# Patient Record
Sex: MALE | Race: BLACK OR AFRICAN AMERICAN | Employment: FULL TIME | RURAL
[De-identification: names, ages, dates, MRNs, and addresses within clinical notes are randomized per-mention and may not be internally consistent; named-entity substitution may affect disease eponyms.]

---
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
## 2019-03-15 ENCOUNTER — TELEPHONE (OUTPATIENT)
Dept: NEUROLOGY | Age: 19
End: 2019-03-15

## 2019-03-15 NOTE — TELEPHONE ENCOUNTER
----- Message from Betina Meraz sent at 3/14/2019  2:31 PM EDT -----  Regarding: Dr. Kelsey Wheeler  Mrs. Greyson Bailon, pt's mother, requesting to schedule an EEG appt referred by [address] [place] [address]. Ms. Greyson Bailon is calling the office to send referral to this office.   Best contact number [phone]

## 2019-03-15 NOTE — TELEPHONE ENCOUNTER
Patient has not been seen by our office.  LM for call back to set up new patient appointment as we do not do tests only here

## 2019-03-28 ENCOUNTER — TELEPHONE (OUTPATIENT)
Dept: NEUROLOGY | Age: 19
End: 2019-03-28

## 2019-03-28 ENCOUNTER — OFFICE VISIT (OUTPATIENT)
Dept: INTERNAL MEDICINE CLINIC | Age: 19
End: 2019-03-28

## 2019-03-28 VITALS
RESPIRATION RATE: 14 BRPM | DIASTOLIC BLOOD PRESSURE: 76 MMHG | HEART RATE: 69 BPM | HEIGHT: 68 IN | OXYGEN SATURATION: 98 % | SYSTOLIC BLOOD PRESSURE: 122 MMHG | TEMPERATURE: 97.5 F | WEIGHT: 165 LBS | BODY MASS INDEX: 25.01 KG/M2

## 2019-03-28 DIAGNOSIS — R55 BLACK-OUT (NOT AMNESIA): Primary | ICD-10-CM

## 2019-03-28 DIAGNOSIS — Z20.2 EXPOSURE TO SEXUALLY TRANSMITTED DISEASE (STD): ICD-10-CM

## 2019-03-28 DIAGNOSIS — F33.1 DEPRESSION, MAJOR, RECURRENT, MODERATE (HCC): ICD-10-CM
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
## 2019-03-28 NOTE — PROGRESS NOTES
Chief Complaint   Patient presents with    Referral Request    Exposure to STD     I have reviewed the patient's medical history in detail and updated the computerized patient record. Health Maintenance reviewed. 1. Have you been to the ER, urgent care clinic since your last visit? Hospitalized since your last visit?no    2. Have you seen or consulted any other health care providers outside of the [address] [place] [address] since your last visit? Include any pap smears or colon screening. No      Encouraged pt to discuss pt's wishes with spouse/partner/family and bring them in the next appt to follow thru with the Advanced Directive    Fall Risk Assessment, last 12 mths 3/28/2019   Able to walk? Yes   Fall in past 12 months? No       3 most recent PHQ Screens 3/28/2019   PHQ Not Done -   Little interest or pleasure in doing things Nearly every day   Feeling down, depressed, irritable, or hopeless Nearly every day   Total Score PHQ 2 6   Trouble falling or staying asleep, or sleeping too much -   Feeling tired or having little energy -   Poor appetite, weight loss, or overeating -   Feeling bad about yourself - or that you are a failure or have let yourself or your family down -   Trouble concentrating on things such as school, work, reading, or watching TV -   Moving or speaking so slowly that other people could have noticed; or the opposite being so fidgety that others notice -   Thoughts of being better off dead, or hurting yourself in some way -   PHQ 9 Score -       Abuse Screening Questionnaire 3/28/2019   Do you ever feel afraid of your partner? N   Are you in a relationship with someone who physically or mentally threatens you? N   Is it safe for you to go home?  Y       ADL Assessment 3/28/2019   Feeding yourself No Help Needed   Getting from bed to chair No Help Needed   Getting dressed No Help Needed   Bathing or showering No Help Needed   Walk across the room (includes cane/walker) No Help Needed Using the telphone No Help Needed   Taking your medications No Help Needed   Preparing meals No Help Needed   Managing money (expenses/bills) No Help Needed   Moderately strenuous housework (laundry) No Help Needed   Shopping for personal items (toiletries/medicines) No Help Needed   Shopping for groceries No Help Needed   Driving Help Needed   Climbing a flight of stairs Help Needed   Getting to places beyond walking distances Help Needed     Lab orders given to patient to take to the Health Dept.

## 2019-03-28 NOTE — TELEPHONE ENCOUNTER
Left a message to have a new patient appointment scheduled, patient needs to be seen before any testing can be ordered.

## 2019-03-28 NOTE — TELEPHONE ENCOUNTER
----- Message from Esau Murguia sent at 3/28/2019 11:20 AM EDT -----  Regarding: Dr Alon Urena  Pt (p) [phone], pt was referred by his PCP [facility] provider Dr Samantha Wolfe,  For seizures and wanted him to schedule an EEG, pt would like a return call back to schedule that test.

## 2019-03-29 ENCOUNTER — TELEPHONE (OUTPATIENT)
Dept: NEUROLOGY | Age: 19
End: 2019-03-29

## 2019-03-29 NOTE — TELEPHONE ENCOUNTER
----- Message from Kaden Landrum sent at 3/28/2019  3:06 PM EDT -----  Regarding: Dr. Kiko Vidales   NP returned a call to schedule an EEG appt. Best contact [phone].

## 2019-04-01 LAB
C TRACH RRNA SPEC QL NAA+PROBE: NEGATIVE
N GONORRHOEA RRNA SPEC QL NAA+PROBE: NEGATIVE

## 2019-04-04 NOTE — TELEPHONE ENCOUNTER
Left a message advising the patient he needs to scheduled a new patient appointment before anything can be ordered since he hasn't been seen in the clinic.

## 2019-05-01 ENCOUNTER — OFFICE VISIT (OUTPATIENT)
Dept: NEUROLOGY | Age: 19
End: 2019-05-01

## 2019-05-01 VITALS
HEART RATE: 87 BPM | WEIGHT: 162 LBS | DIASTOLIC BLOOD PRESSURE: 70 MMHG | BODY MASS INDEX: 24.55 KG/M2 | SYSTOLIC BLOOD PRESSURE: 128 MMHG | OXYGEN SATURATION: 98 % | HEIGHT: 68 IN

## 2019-05-01 DIAGNOSIS — R56.9 SEIZURES (HCC): Primary | ICD-10-CM

## 2019-05-01 DIAGNOSIS — F20.81 SCHIZOPHRENIFORM DISORDER (HCC): ICD-10-CM

## 2019-05-01 DIAGNOSIS — F22 PARANOID BEHAVIOR (HCC): ICD-10-CM

## 2019-05-01 NOTE — PATIENT INSTRUCTIONS
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
## 2019-05-09 ENCOUNTER — TELEPHONE (OUTPATIENT)
Dept: NEUROLOGY | Age: 19
End: 2019-05-09

## 2019-05-09 NOTE — TELEPHONE ENCOUNTER
----- Message from Jose Self sent at 5/9/2019 11:57 AM EDT -----  Regarding: Dr. Luigi Johnson: [phone]  Pt requesting a call back with appt time and date for his EEG and MRI.

## 2019-05-21 ENCOUNTER — HOSPITAL ENCOUNTER (OUTPATIENT)
Dept: MRI IMAGING | Age: 19
Discharge: HOME | End: 2019-05-21
Attending: PSYCHIATRY & NEUROLOGY
Payer: COMMERCIAL

## 2019-05-21 ENCOUNTER — HOSPITAL ENCOUNTER (OUTPATIENT)
Dept: NEUROLOGY | Age: 19
Discharge: HOME | End: 2019-05-21
Attending: PSYCHIATRY & NEUROLOGY
Payer: COMMERCIAL

## 2019-05-21 DIAGNOSIS — R56.9 SEIZURES (HCC): ICD-10-CM

## 2019-05-21 DIAGNOSIS — F12.10 MARIJUANA ABUSE: ICD-10-CM

## 2019-05-21 DIAGNOSIS — F22 PARANOID BEHAVIOR (HCC): ICD-10-CM

## 2019-05-21 PROCEDURE — 70551 MRI BRAIN STEM W/O DYE: CPT

## 2019-05-21 PROCEDURE — 95816 EEG AWAKE AND DROWSY: CPT

## 2019-05-22 NOTE — PROCEDURES
Migel Shaw, [address]      Electroencephalogram         Procedure Date: 5/21/2019    Procedure ID: [id number]    Procedure Type: Routine    Patient Name: Marice Mcardle. YOB: 2000    Medical Record No: [id number]    INDICATION: Altered mental status    Medications:  No current outpatient medications on file. No current facility-administered medications for this encounter. DESCRIPTION OF PROCEDURE: Electrodes were applied in accordance with the international 10-20 system of electrode placement. EEG was reviewed in both bipolar and referential montages    DESCRIPTION OF FINDINGS: Background consists of symmetric 12 Hz hz posterior dominant activity. This activity attenuates with eye opening. With photic stimulation a symmetric occipital driving response is noted. No seizures or interictal hallmarks of epilepsy were noted. IMPRESSION:  Normal EEG. Absence of seizures on this study does not exclude epilepsy. Clinical correlation is advised.

## 2019-05-29 ENCOUNTER — TELEPHONE (OUTPATIENT)
Dept: NEUROLOGY | Age: 19
End: 2019-05-29

## 2019-05-29 NOTE — TELEPHONE ENCOUNTER
Results of EEG  IMPRESSION:  Normal EEG.      Please provide the results if this patient's MRI of the brain.

## 2019-11-21 NOTE — LETTER
NOTIFICATION RETURN TO WORK / SCHOOL 
 
1/19/2017 11:30 AM 
 
Mr. Silvia Kim [address] [address] To Whom It May Concern: 
 
Taya Almaraz. is currently under the care of [address]. He will return to work/school on January 22, 2017. If there are questions or concerns please have the patient contact our office.  
 
 
 
Sincerely, 
 
 
Gerhardt Dupont, NP 
 
                                
 
 Vasectomy is an outpatient procedure. This means you can go home the same day. It can be done in a doctor s office, clinic, or hospital. Your doctor will talk with you about how to get ready for surgery. He or she will also discuss the possible risks and complications with you. After the procedure, follow your doctor s advice for recovery.    Risks and possible complications of vasectomy  Vasectomy is safe. But it does have risks. They include the following:    Bleeding or infection    Sperm granuloma. This is a small, harmless lump. It may form where the vas deferens is sealed off.    Sperm buildup (congestion). This may cause soreness in the testes. Anti-inflammatory medicine can provide relief.    Epididymitis. This is inflammation that may cause scrotal aching. It often goes away without treatment. Anti-inflammatory medicine can provide relief.    Reconnection of the vas deferens. This can occur in rare cases. It makes you fertile again. This may result in an unplanned pregnancy.    Sperm antibodies. Developing antibodies is a common response of your body to the absorbed sperm. The antibodies can make you sterile. This is true even if you later try to reverse your vasectomy.    Long-term testicular discomfort. This may occur after surgery. But it s very rare.    Getting ready for surgery  Your doctor will talk with you about getting ready for surgery. You may be asked to do the following:    Sign a consent form. This must be done at least a few days before surgery. It gives your doctor permission to do the procedure. It also states that a vasectomy is not guaranteed to make you sterile.    Don t take aspirin, ibuprofen, or naproxen for 2 weeks before surgery. These medicines can cause bleeding after the procedure. Also, tell your doctor if you take any medicines, supplements, or herbal remedies.    Tell your doctor if you ve had any scrotal surgery in the past.    Arrange for an adult family member or friend to  give you a ride home after surgery.    Shower and clean your scrotum the day of surgery.     Bring a jock strap (athletic supporter) or pair of snug cotton briefs to the doctor s office or hospital.    Eat no more than a light snack before surgery.      No-Scalpel Vasectomy      The no-scalpel procedure is similar to a traditional vasectomy in many respects, but it s done without incisions or stitches. This generally results in faster healing.       Front view of penis and scrotum showing vas deferens during and after vasectomy.        During surgery  The entire procedure usually lasts less than 30 minutes.    You ll be asked to undress from the waist down and lie on a table.    You may be given medicine to help you relax. To prevent pain during surgery, you ll be given an injection of pain medicine in your scrotum or lower groin.    Once the area is numb, the doctor makes one or two small incisions in the scrotum. This may be done with a scalpel or with a pointed clamp (no-scalpel method).    The vas deferens are lifted through the incision and cut. The provider seals off the ends of the vas deferens using one of several methods.    If needed, the incision is closed with stitches.    You can rest for a while until you re ready to go home.    During your healing  Recovery time after a no-scalpel vasectomy is usually less than after a traditional vasectomy. Once you re home, you can do several things to aid your recovery:    Stay off your feet as much as possible for the first day to lessen the chance of swelling.     Wear an athletic support or snug cotton briefs for support.    Wait 24 hours before bathing.    Reduce swelling by using an ice pack or bag of frozen peas wrapped in a thin towel. Put the ice pack or towel on your scrotum.    Avoid heavy lifting or exercise for at least 7 days.    Take medicines with acetaminophen to relieve any discomfort. Don t use aspirin, ibuprofen, or naproxen.    Rest (limited  activity) is recommended for approximately 72 hours after the procedure. You may increase your activity on the third day, but vigorous exercise (jogging, basketball, etc.) is not advised until a week after your procedure. No heavy lifting or very strenuous activity for ten (10) days.    Sex after vasectomy  Vasectomy doesn t change your sexual function. So when you start having sex again, it should feel the same as before. A vasectomy also shouldn t affect your relationship with your partner. It s important to remember, though, that you won t become sterile right away. It will take time before you can have sex without the need for birth control. It is recommended that you refrain from having sexual intercourse and ejaculation for one (1) week. This is to permit the cut ends of the vas deferens to close before pressure is put on them.     Until you re sterile: After a vasectomy, some active sperm still remain in your semen. It will take time and many ejaculations before the sperm are completely gone. During this period, you must use another birth control method to prevent pregnancy. To make sure no sperm are left in your semen, you ll need to have one or more semen exams. You usually collect a semen sample at home and bring it to a lab. The sample is then checked under a microscope. You re sterile only when these samples show no evidence of sperm. Ask your doctor whether additional follow-up is needed.    After you re sterile: After your doctor tells you you re sterile, you no longer need to use any form of birth control. You re free to have sex without the fear of unwanted pregnancy. But a vasectomy does not protect you from sexually transmitted diseases (STDs). If you have more than one sex partner, be sure to practice safer sex by using condoms.    When to seek medical care  Call your doctor if you notice any of the following after surgery:    Increasing pain or swelling in your scrotum    A large black-and-blue  area, or a growing lump    Fever or chills    Increasing redness or drainage of the incision    Trouble urinating    Date Last Reviewed: 1/1/2017 2000-2018 [employer]. [address] [place] [address], [place] [address]. All rights reserved. This information is not intended as a substitute for professional medical care. Always follow your healthcare professional's instructions.

## 2020-01-01 ENCOUNTER — HOSPITAL ENCOUNTER (EMERGENCY)
Age: 20
Discharge: HOME | End: 2020-01-01
Attending: EMERGENCY MEDICINE
Payer: COMMERCIAL

## 2020-01-01 VITALS
TEMPERATURE: 98 F | WEIGHT: 158.51 LBS | SYSTOLIC BLOOD PRESSURE: 130 MMHG | RESPIRATION RATE: 18 BRPM | BODY MASS INDEX: 22.69 KG/M2 | DIASTOLIC BLOOD PRESSURE: 82 MMHG | OXYGEN SATURATION: 99 % | HEIGHT: 70 IN | HEART RATE: 76 BPM

## 2020-01-01 DIAGNOSIS — F32.A DEPRESSION, UNSPECIFIED DEPRESSION TYPE: ICD-10-CM

## 2020-01-01 DIAGNOSIS — R44.0 AUDITORY HALLUCINATIONS: ICD-10-CM

## 2020-01-01 DIAGNOSIS — E87.6 ACUTE HYPOKALEMIA: ICD-10-CM

## 2020-01-01 DIAGNOSIS — F39 MOOD DISORDER (HCC): ICD-10-CM

## 2020-01-01 DIAGNOSIS — F20.9 SCHIZOPHRENIA, UNSPECIFIED TYPE (HCC): Primary | ICD-10-CM

## 2020-01-01 LAB
ALBUMIN SERPL-MCNC: 4.5 G/DL (ref 3.5–5)
ALBUMIN/GLOB SERPL: 1.4 {RATIO} (ref 1.1–2.2)
ALP SERPL-CCNC: 72 U/L (ref 45–117)
ALT SERPL-CCNC: 27 U/L (ref 12–78)
AMPHET UR QL SCN: NEGATIVE
ANION GAP SERPL CALC-SCNC: 4 MMOL/L (ref 5–15)
APAP SERPL-MCNC: <2 UG/ML (ref 10–30)
APPEARANCE UR: CLEAR
AST SERPL-CCNC: 21 U/L (ref 15–37)
BACTERIA URNS QL MICRO: NEGATIVE /HPF
BARBITURATES UR QL SCN: NEGATIVE
BASOPHILS # BLD: 0 K/UL (ref 0–0.1)
BASOPHILS NFR BLD: 0 % (ref 0–1)
BENZODIAZ UR QL: NEGATIVE
BILIRUB SERPL-MCNC: 0.5 MG/DL (ref 0.2–1)
BILIRUB UR QL: NEGATIVE
BUN SERPL-MCNC: 11 MG/DL (ref 6–20)
BUN/CREAT SERPL: 10 (ref 12–20)
CALCIUM SERPL-MCNC: 9.4 MG/DL (ref 8.5–10.1)
CANNABINOIDS UR QL SCN: NEGATIVE
CHLORIDE SERPL-SCNC: 105 MMOL/L (ref 97–108)
CO2 SERPL-SCNC: 30 MMOL/L (ref 21–32)
COCAINE UR QL SCN: NEGATIVE
COLOR UR: ABNORMAL
CREAT SERPL-MCNC: 1.09 MG/DL (ref 0.7–1.3)
DIFFERENTIAL METHOD BLD: NORMAL
DRUG SCRN COMMENT,DRGCM: NORMAL
EOSINOPHIL # BLD: 0.1 K/UL (ref 0–0.4)
EOSINOPHIL NFR BLD: 1 % (ref 0–7)
EPITH CASTS URNS QL MICRO: ABNORMAL /LPF
ERYTHROCYTE [DISTWIDTH] IN BLOOD BY AUTOMATED COUNT: 12.9 % (ref 11.5–14.5)
ETHANOL SERPL-MCNC: <10 MG/DL
GLOBULIN SER CALC-MCNC: 3.2 G/DL (ref 2–4)
GLUCOSE SERPL-MCNC: 104 MG/DL (ref 65–100)
GLUCOSE UR STRIP.AUTO-MCNC: NEGATIVE MG/DL
HCT VFR BLD AUTO: 43.9 % (ref 36.6–50.3)
HGB BLD-MCNC: 15.2 G/DL (ref 12.1–17)
HGB UR QL STRIP: NEGATIVE
HYALINE CASTS URNS QL MICRO: ABNORMAL /LPF (ref 0–5)
IMM GRANULOCYTES # BLD AUTO: 0 K/UL (ref 0–0.04)
IMM GRANULOCYTES NFR BLD AUTO: 0 % (ref 0–0.5)
KETONES UR QL STRIP.AUTO: NEGATIVE MG/DL
LEUKOCYTE ESTERASE UR QL STRIP.AUTO: ABNORMAL
LYMPHOCYTES # BLD: 3 K/UL (ref 0.8–3.5)
LYMPHOCYTES NFR BLD: 38 % (ref 12–49)
MCH RBC QN AUTO: 28.7 PG (ref 26–34)
MCHC RBC AUTO-ENTMCNC: 34.6 G/DL (ref 30–36.5)
MCV RBC AUTO: 83 FL (ref 80–99)
METHADONE UR QL: NEGATIVE
MONOCYTES # BLD: 0.8 K/UL (ref 0–1)
MONOCYTES NFR BLD: 11 % (ref 5–13)
NEUTS SEG # BLD: 3.8 K/UL (ref 1.8–8)
NEUTS SEG NFR BLD: 50 % (ref 32–75)
NITRITE UR QL STRIP.AUTO: NEGATIVE
NRBC # BLD: 0 K/UL (ref 0–0.01)
NRBC BLD-RTO: 0 PER 100 WBC
OPIATES UR QL: NEGATIVE
PCP UR QL: NEGATIVE
PH UR STRIP: 7 [PH] (ref 5–8)
PLATELET # BLD AUTO: 232 K/UL (ref 150–400)
PMV BLD AUTO: 9.5 FL (ref 8.9–12.9)
POTASSIUM SERPL-SCNC: 3.1 MMOL/L (ref 3.5–5.1)
PROT SERPL-MCNC: 7.7 G/DL (ref 6.4–8.2)
PROT UR STRIP-MCNC: NEGATIVE MG/DL
RBC # BLD AUTO: 5.29 M/UL (ref 4.1–5.7)
RBC #/AREA URNS HPF: ABNORMAL /HPF (ref 0–5)
SALICYLATES SERPL-MCNC: <1.7 MG/DL (ref 2.8–20)
SODIUM SERPL-SCNC: 139 MMOL/L (ref 136–145)
SP GR UR REFRACTOMETRY: 1.02 (ref 1–1.03)
UA: UC IF INDICATED,UAUC: ABNORMAL
UROBILINOGEN UR QL STRIP.AUTO: 1 EU/DL (ref 0.2–1)
WBC # BLD AUTO: 7.7 K/UL (ref 4.1–11.1)
WBC URNS QL MICRO: ABNORMAL /HPF (ref 0–4)

## 2020-01-01 PROCEDURE — 81001 URINALYSIS AUTO W/SCOPE: CPT

## 2020-01-01 PROCEDURE — 87086 URINE CULTURE/COLONY COUNT: CPT

## 2020-01-01 PROCEDURE — 80053 COMPREHEN METABOLIC PANEL: CPT

## 2020-01-01 PROCEDURE — 36415 COLL VENOUS BLD VENIPUNCTURE: CPT

## 2020-01-01 PROCEDURE — 90791 PSYCH DIAGNOSTIC EVALUATION: CPT

## 2020-01-01 PROCEDURE — 99283 EMERGENCY DEPT VISIT LOW MDM: CPT

## 2020-01-01 PROCEDURE — 85025 COMPLETE CBC W/AUTO DIFF WBC: CPT

## 2020-01-01 PROCEDURE — 80307 DRUG TEST PRSMV CHEM ANLYZR: CPT

## 2020-01-01 PROCEDURE — 74011250637 HC RX REV CODE- 250/637: Performed by: EMERGENCY MEDICINE

## 2020-01-01 RX ORDER — POTASSIUM CHLORIDE 750 MG/1
40 TABLET, FILM COATED, EXTENDED RELEASE ORAL
Status: COMPLETED | OUTPATIENT
Start: 2020-01-01 | End: 2020-01-01

## 2020-01-01 RX ADMIN — POTASSIUM CHLORIDE 40 MEQ: 750 TABLET, FILM COATED, EXTENDED RELEASE ORAL at 23:34

## 2020-01-02 NOTE — ED NOTES
Called [facility] and spoke with Yris who stated that they will be doing a couple consults with patients at [facility] but when she is done with those, she will tele-conference with the patient.

## 2020-01-02 NOTE — ED NOTES
Per MD, patient no longer needs a sitter and he will be working on discharge papers. Patient discussed an outpatient plan with [facility] and mother is on board with this plan. Patient given back his belongings and is currently getting dressed.

## 2020-01-02 NOTE — ED NOTES
Spoke with davis from [facility] regarding patient. Niels Qiu recommends discharge at this time and will speak with  regarding this.

## 2020-01-02 NOTE — ED PROVIDER NOTES
EMERGENCY DEPARTMENT HISTORY AND PHYSICAL EXAM      Please note that this dictation was completed with [employer], the computer voice recognition software. Quite often unanticipated grammatical, syntax, homophones, and other interpretive errors are inadvertently transcribed by the computer software. Please disregard these errors and any errors that have escaped final proofreading. Thank you. Date: 1/1/2020  Patient Name: Terry Soto. Patient Age and Sex: 20 y.o. male    History of Presenting Illness     Chief Complaint   Patient presents with    Mental Health Problem     pt. is schizophrenic.  reports hearing voices and has SI/voices are telling him to hurt himself. reports taking medications inconsistently       History Provided By: Patient    HPI: Terry Soto., 20 y.o. male with past medical history as documented below presents to the ED with c/o of several days of auditory hallucinations which are telling him to hurt himself. Patient does have a history of schizophrenia and reports taking Apripazole. Denies any actual plan to harm self. He has been admitted previously for psych issues related to trying to hang himself and overdosing back in 2017. Pt states he has an appointment with [facility] on Jan 30th and stated he would call to attempt to get earlier appointment to discuss his medications. Reports that he is not compliant with medications and take them inconsistently. Patient states that today was a bad day for him. He states that he had an episode which became very irritated. He reports insomnia as well. He currently denies any SI, HI. He reports he feels safe at home and he is waiting for a job interview at [employer]. Pt denies any other alleviating or exacerbating factors.  Additionally, pt specifically denies any recent fever, chills, headache, nausea, vomiting, abdominal pain, CP, SOB, lightheadedness, dizziness, numbness, weakness, BLE swelling, heart palpitations, urinary sxs, diarrhea, constipation, melena, hematochezia, cough, or congestion. There are no other complaints, changes or physical findings at this time. PCP: Licha Louise MD    Past History   Past Medical History:  Past Medical History:   Diagnosis Date    Mental disorder     Reactive airway disease        Past Surgical History:  Past Surgical History:   Procedure Laterality Date    HX APPENDECTOMY         Family History:  Family History   Problem Relation Age of Onset    Seizures Neg Hx     Parkinsonism Neg Hx        Social History:  Social History     Tobacco Use    Smoking status: Former Smoker     Packs/day: 0.25     Types: Cigarettes     Start date: 6/1/2011    Smokeless tobacco: Never Used   Substance Use Topics    Alcohol use: No     Alcohol/week: 0.0 standard drinks    Drug use: Not Currently     Types: Marijuana     Comment: regular use- weekly since age 14       Allergies:  No Known Allergies    Current Medications:  No current facility-administered medications on file prior to encounter. No current outpatient medications on file prior to encounter. Review of Systems   Review of Systems   Constitutional: Negative. Negative for chills and fever. HENT: Negative. Negative for congestion, facial swelling, rhinorrhea, sore throat, trouble swallowing and voice change. Eyes: Negative. Respiratory: Negative. Negative for apnea, cough, chest tightness, shortness of breath and wheezing. Cardiovascular: Negative. Negative for chest pain, palpitations and leg swelling. Gastrointestinal: Negative. Negative for abdominal distention, abdominal pain, blood in stool, constipation, diarrhea, nausea and vomiting. Endocrine: Negative. Negative for cold intolerance, heat intolerance and polyuria. Genitourinary: Negative. Negative for difficulty urinating, dysuria, flank pain, frequency, hematuria and urgency. Musculoskeletal: Negative.   Negative for arthralgias, back pain, myalgias, neck pain and neck stiffness. Skin: Negative. Negative for color change and rash. Neurological: Negative. Negative for dizziness, syncope, facial asymmetry, speech difficulty, weakness, light-headedness, numbness and headaches. Hematological: Negative. Does not bruise/bleed easily. Psychiatric/Behavioral: Positive for behavioral problems, hallucinations and sleep disturbance. Negative for confusion and self-injury. The patient is nervous/anxious. Physical Exam   Physical Exam  Vitals signs and nursing note reviewed. Constitutional:       Appearance: He is well-developed. He is not toxic-appearing. HENT:      Head: Normocephalic and atraumatic. Mouth/Throat:      Pharynx: No posterior oropharyngeal erythema. Eyes:      Conjunctiva/sclera: Conjunctivae normal.      Pupils: Pupils are equal, round, and reactive to light. Neck:      Musculoskeletal: Normal range of motion. Cardiovascular:      Rate and Rhythm: Normal rate and regular rhythm. Heart sounds: Normal heart sounds. No murmur. No friction rub. No gallop. Pulmonary:      Effort: Pulmonary effort is normal. No respiratory distress. Breath sounds: Normal breath sounds. No wheezing or rales. Chest:      Chest wall: No tenderness. Abdominal:      General: Bowel sounds are normal. There is no distension. Palpations: Abdomen is soft. There is no mass. Tenderness: There is no tenderness. There is no guarding or rebound. Musculoskeletal: Normal range of motion. General: No tenderness or deformity. Skin:     General: Skin is warm. Findings: No rash. Neurological:      Mental Status: He is alert and oriented to person, place, and time. Cranial Nerves: No cranial nerve deficit. Motor: No abnormal muscle tone. Coordination: Coordination normal.      Deep Tendon Reflexes: Reflexes normal.   Psychiatric:         Behavior: Behavior is cooperative.          Diagnostic Study Results Labs -  Recent Results (from the past 24 hour(s))   CBC WITH AUTOMATED DIFF    Collection Time: 01/01/20  9:55 PM   Result Value Ref Range    WBC 7.7 4.1 - 11.1 K/uL    RBC 5.29 4. 10 - 5.70 M/uL    HGB 15.2 12.1 - 17.0 g/dL    HCT 43.9 36.6 - 50.3 %    MCV 83.0 80.0 - 99.0 FL    MCH 28.7 26.0 - 34.0 PG    MCHC 34.6 30.0 - 36.5 g/dL    RDW 12.9 11.5 - 14.5 %    PLATELET 698 233 - 861 K/uL    MPV 9.5 8.9 - 12.9 FL    NRBC 0.0 0  WBC    ABSOLUTE NRBC 0.00 0.00 - 0.01 K/uL    NEUTROPHILS 50 32 - 75 %    LYMPHOCYTES 38 12 - 49 %    MONOCYTES 11 5 - 13 %    EOSINOPHILS 1 0 - 7 %    BASOPHILS 0 0 - 1 %    IMMATURE GRANULOCYTES 0 0.0 - 0.5 %    ABS. NEUTROPHILS 3.8 1.8 - 8.0 K/UL    ABS. LYMPHOCYTES 3.0 0.8 - 3.5 K/UL    ABS. MONOCYTES 0.8 0.0 - 1.0 K/UL    ABS. EOSINOPHILS 0.1 0.0 - 0.4 K/UL    ABS. BASOPHILS 0.0 0.0 - 0.1 K/UL    ABS. IMM. GRANS. 0.0 0.00 - 0.04 K/UL    DF AUTOMATED     METABOLIC PANEL, COMPREHENSIVE    Collection Time: 01/01/20  9:55 PM   Result Value Ref Range    Sodium 139 136 - 145 mmol/L    Potassium 3.1 (L) 3.5 - 5.1 mmol/L    Chloride 105 97 - 108 mmol/L    CO2 30 21 - 32 mmol/L    Anion gap 4 (L) 5 - 15 mmol/L    Glucose 104 (H) 65 - 100 mg/dL    BUN 11 6 - 20 MG/DL    Creatinine 1.09 0.70 - 1.30 MG/DL    BUN/Creatinine ratio 10 (L) 12 - 20      GFR est AA >60 >60 ml/min/1.73m2    GFR est non-AA >60 >60 ml/min/1.73m2    Calcium 9.4 8.5 - 10.1 MG/DL    Bilirubin, total 0.5 0.2 - 1.0 MG/DL    ALT (SGPT) 27 12 - 78 U/L    AST (SGOT) 21 15 - 37 U/L    Alk.  phosphatase 72 45 - 117 U/L    Protein, total 7.7 6.4 - 8.2 g/dL    Albumin 4.5 3.5 - 5.0 g/dL    Globulin 3.2 2.0 - 4.0 g/dL    A-G Ratio 1.4 1.1 - 2.2     ETHYL ALCOHOL    Collection Time: 01/01/20  9:55 PM   Result Value Ref Range    ALCOHOL(ETHYL),SERUM <48 <41 MG/DL   SALICYLATE    Collection Time: 01/01/20  9:55 PM   Result Value Ref Range    Salicylate level <4.7 (L) 2.8 - 20.0 MG/DL   ACETAMINOPHEN    Collection Time: 01/01/20 9:55 PM   Result Value Ref Range    Acetaminophen level <2 (L) 10 - 30 ug/mL   URINALYSIS W/ REFLEX CULTURE    Collection Time: 01/01/20  9:55 PM   Result Value Ref Range    Color YELLOW/STRAW      Appearance CLEAR CLEAR      Specific gravity 1.021 1.003 - 1.030      pH (UA) 7.0 5.0 - 8.0      Protein NEGATIVE  NEG mg/dL    Glucose NEGATIVE  NEG mg/dL    Ketone NEGATIVE  NEG mg/dL    Bilirubin NEGATIVE  NEG      Blood NEGATIVE  NEG      Urobilinogen 1.0 0.2 - 1.0 EU/dL    Nitrites NEGATIVE  NEG      Leukocyte Esterase MODERATE (A) NEG      WBC 20-50 0 - 4 /hpf    RBC 0-5 0 - 5 /hpf    Epithelial cells FEW FEW /lpf    Bacteria NEGATIVE  NEG /hpf    UA:UC IF INDICATED URINE CULTURE ORDERED (A) CNI      Hyaline cast 0-2 0 - 5 /lpf   DRUG SCREEN, URINE    Collection Time: 01/01/20  9:55 PM   Result Value Ref Range    AMPHETAMINES NEGATIVE  NEG      BARBITURATES NEGATIVE  NEG      BENZODIAZEPINES NEGATIVE  NEG      COCAINE NEGATIVE  NEG      METHADONE NEGATIVE  NEG      OPIATES NEGATIVE  NEG      PCP(PHENCYCLIDINE) NEGATIVE  NEG      THC (TH-CANNABINOL) NEGATIVE  NEG      Drug screen comment (NOTE)        Radiologic Studies -   No orders to display     CT Results  (Last 48 hours)    None        CXR Results  (Last 48 hours)    None          Medical Decision Making   I am the first provider for this patient. I reviewed the vital signs, available nursing notes, past medical history, past surgical history, family history and social history. Vital Signs-Reviewed the patient's vital signs.   Patient Vitals for the past 24 hrs:   Temp Pulse Resp BP SpO2   01/01/20 2240 98 °F (36.7 °C) 76 18 130/82 99 %   01/01/20 2221 -- -- -- -- 100 %   01/01/20 2110 97.8 °F (36.6 °C) 78 16 (!) 145/94 100 %     Pulse Oximetry Analysis - 100% on RA    Cardiac Monitor:   Rate: 78 bpm  Rhythm: Normal Sinus Rhythm      Records Reviewed: Nursing Notes, Old Medical Records, Previous electrocardiograms, Previous Radiology Studies and Previous Laboratory Studies    Provider Notes (Medical Decision Making):   Patient presents with acute suicidal ideation. DDx:  2/2 MDD, schizoaffective d/o, bipolar, drug induced, organic cause such as electrolyte anomoly or infection. Stable vitals and benign exam. No obvious organic causes to explain behavior but will obtain psych labs, UA, UDS and speak with mental health professional about possible admission. Pt is currently voluntary. Sitter at bedside. Will continue to monitor while in ED. ED Course:   Initial assessment performed. The patients presenting problems have been discussed, and they are in agreement with the care plan formulated and outlined with them. I have encouraged them to ask questions as they arise throughout their visit. ALCOHOL/SUBSTANCE ABUSE COUNSELING:  Upon evaluation, pt endorsed recent alcohol/illicit drug use. For approximately 15 minutes, pt has been counseled on the dangers of alcohol and illicit drug use on their health, and they were encouraged to quit as soon as possible in order to decrease further risks to their health. Pt has conveyed their understanding of the risks involved should they continue to use these products. I reviewed our electronic medical record system for any past medical records that were available that may contribute to the patient's current condition, the nursing notes and vital signs from today's visit.   mAie Cosby MD    ED Orders Placed :  Orders Placed This Encounter    CULTURE, URINE    CBC WITH AUTOMATED DIFF    METABOLIC PANEL, COMPREHENSIVE    BLOOD ALCOHOL (Ethyl Alcohol)    SALICYLATE    ACETAMINOPHEN    URINALYSIS W/ REFLEX CULTURE    DRUG SCREEN, URINE    INSERT PERIPHERAL IV ONE TIME STAT    potassium chloride SR (KLOR-CON 10) tablet 40 mEq    IP CONSULT TO [facility]     ED Medications Administered:  Medications   potassium chloride SR (KLOR-CON 10) tablet 40 mEq (40 mEq Oral Given 1/1/20 5352)         Consult Note:  Jimmy Moore MD spoke with Hellen Navarrete  Specialty: [facility]   Discussed pt's hx, disposition, and available diagnostic and imaging results. Reviewed care plans. Agree with management and plan thus far. Consultant has evaluated patient and he is safe for discharge home with mom. Progress Note:  Patient has been reassessed and reports feeling better and symptoms have improved significantly after ED treatment. Patient feels comfortable going home with close follow-up. Maldonado Whitfield Jr.'s final labs and imaging have been reviewed with him and available family and/or caregiver. They have been counseled regarding his diagnosis. He verbally conveys understanding and agreement of the signs, symptoms, diagnosis, treatment and prognosis and additionally agrees to follow up as recommended with Dr. Kaycee Mujica MD and/or specialist in 24 - 48 hours. He also agrees with the care-plan we created together and conveys that all of his questions have been answered. I have also put together some discharge instructions for him that include: 1) educational information regarding their diagnosis, 2) how to care for their diagnosis at home, as well a 3) list of reasons why they would want to return to the ED prior to their follow-up appointment should the patient's condition change or symptoms worsen. I have answered all questions to the patient's satisfaction. Strict return precautions given. He both understood and agreed with plan as discussed. Vital signs stable for discharge. Disposition: Discharge  The pt is ready for discharge. The pt's signs, symptoms, diagnosis, and discharge instructions have been discussed and pt has conveyed their understanding. The pt is to follow up as recommended or return to ER should their symptoms worsen. Plan has been discussed and pt is in agreement. Plan:  1. Return precautions as discussed. 2. No current facility-administered medications for this encounter.    No current outpatient medications on file.    3.   Follow-up Information     Follow up With Specialties Details Why Contact Info      Schedule an appointment as soon as possible for a visit      [facility] EMERGENCY DEPT Emergency Medicine   [address]  [address] [facility]  [phone]          Instructed to return to ED if worse  Diagnosis     Clinical Impression:   1. Schizophrenia, unspecified type ([facility] [address]    2. Depression, unspecified depression type    3. Mood disorder ([facility] [address]    4. Acute hypokalemia    5. Auditory hallucinations        Attestation:  I personally performed the services described in this documentation on this date 1/1/2020 for patient Merlinda Horseman. . I have reviewed and verified that the information is accurate and complete. Barbara Siemens, MD      This note will not be viewable in [address]

## 2020-01-02 NOTE — DISCHARGE INSTRUCTIONS
Ira Banuelos scheduled using triage protocol. Patients will be evaluated and referred to appropriate treatment. A  is usually assigned, but there is usually a waiting list. All [place] residents without financial resources may be referred to [facility]. Patients must bring proof that they are residents of the [address] ([employer], rent receipt, picture ID, etc.).   [phone]  Crisis: [facility]  [address] [facility]     [phone]  Detox unit: [address]  [address] [facility]       Intakes are Monday, Wednesday, Thursday from 8 AM - 12 noon. Patients may walk in any day and speak with a counselor. Patient must bring a picture ID.   [phone]   The [facility]       No detox available. Patient must be medically stable and able to work. Patient needs social security card and an ID. Patient does not need to be homeless. [address] [place] [address]       Detoxification available. Patients must be medically-cleared to go to detox and must be free of benzodiazepines and barbituates. THP prefers if they also have Clonidine available to help them with their detox. [address] [id number] program available for men. Patients need to be able to work and follow rules. 90 days inpatient followed by 90 days in a jail house.    Valeria Jessica  that hosts a number of [address] and NA groups each week   [phone]   [employer]  [address] [facility] Patients must first go through registration and financial eligibility screening, 8 - 11:30 AM and 1 - 4 PM Mondays through Friday. [facility] also provides homeless services, vision, dental and medical services. [address] outpatient and some inpatient (sober living houses) for men and women. Fees are determined at time of admission. Patient must be able to work and follow rules. [address] for [address] [facility]       Outpatient program for men, women and adolescents. Assessments can usually be scheduled within 24 hours. Intensive outpatient programs also available. Methadone and Suboxone detoxification also available. They do not accept Medicaid or Medicare. [address] [facility] Google End: [address] End: [address]   Centralized Intake: [phone]  Crisis: Jacques Hernández. [phone]  Crisis: [facility]  [address] [facility]   [phone]  Crisis: [phone]   [address] Thomson Providers    Accepts Insured Patients Only:  [facility]  [address]       [phone]   Near the corner of [facility] and [facility] 430 in the near [address] [facility] S end of [facility]. Accepts most insurance including Medicaid/Medicare. No psychiatry. On the [facility] bus line. [address] [facility] 135 [phone]  [address] [facility] ([address]  2000 [facility]. [address] [facility], [address] [place])     [phone]   Accepts most major insurances. Psychiatry available. Some DBT groups. [facility]    [phone]   Mixture of psychologists and psychiatrists. They do not accept Medicaid or Medicare.     The [address] [place] [address] Road       [phone]   Mixture of clinical social workers and psychologists. Sliding Scale/Financial Aid/Differing Payment Options  [facility]  [address]      [phone]   Our own Leicester Longoria and Cheryle Net. Variety of treatment options, including DBT. [facility]  [address]       [phone]   Provides a variety of group and individual counseling options. Insurance, Medicaid, Medicare and sliding scale      Medicaid/Medicare providers in the [address] area  [address] [place] [address]. 22nd [address]       [phone]    [facility]         [address]       [phone]    [place]  [address], [address]    [phone] ex. [address] [facility]     [phone]    [address] [place]      [phone]      Services for patients without Medicaid, Medicare or Insurance  The [address] [place] [address] Drive       [phone]   See handout in separate folder    [facility]    [phone]      Thank you for allowing us to take care of you today! We hope we addressed all of your concerns and needs. We strive to provide excellent quality care in the Emergency Department. You will receive a survey after your visit to evaluate the care you were provided. Should you receive a survey from us, we invite you to share your experience and tell us what made it excellent. It was a pleasure serving you, we invite you to share your experience with us, in our pursuit for excellence, should you be selected to receive a survey. The exam and treatment you received in the Emergency Department were for an urgent problem and are not intended as complete care. It is important that you follow up with a doctor, nurse practitioner, or physician assistant for ongoing care.  If your symptoms become worse or you do not improve as expected and you are unable to reach your usual health care provider, you should return to the Emergency Department. We are available 24 hours a day. Please take your discharge instructions with you when you go to your follow-up appointment. If you have any problem arranging a follow-up appointment, contact the Emergency Department immediately. If a prescription has been provided, please have it filled as soon as possible to prevent a delay in treatment. Read the entire medication instruction sheet provided to you by the pharmacy. If you have any questions or reservations about taking the medication due to side effects or interactions with other medications, please call your primary care physician or contact the ER to speak with the charge nurse. Make an appointment with your family doctor or the physician you were referred to for follow-up of this visit as instructed on your discharge paperwork, as this is mandatory follow-up. Return to the ER if you are unable to be seen or if you are unable to be seen in a timely manner. If you have any problem arranging the follow-up visit, contact the Emergency Department immediately. I hope you feel better and thank you again for allow us to provide you with excellent care today at [facility]!       Warmest regards,    Aron Lowe MD  Emergency Medicine Physician  [facility]      _____________________________________________________________________________________________________________    Vitals:    01/01/20 2110 01/01/20 2221 01/01/20 2240   BP: (!) 145/94  130/82   BP 1 Location: Left arm  Left arm   BP Patient Position: At rest  At rest   Pulse: 78  76   Resp: 16  18   Temp: 97.8 °F (36.6 °C)  98 °F (36.7 °C)   SpO2: 100% 100% 99%   Weight: 71.9 kg (158 lb 8.2 oz)     Height: 5' 10\" (1.778 m)         Recent Results (from the past 12 hour(s))   CBC WITH AUTOMATED DIFF    Collection Time: 01/01/20  9:55 PM   Result Value Ref Range    WBC 7.7 4.1 - 11.1 K/uL    RBC 5.29 4. 10 - 5.70 M/uL    HGB 15.2 12.1 - 17.0 g/dL    HCT 43.9 36.6 - 50.3 %    MCV 83.0 80.0 - 99.0 FL    MCH 28.7 26.0 - 34.0 PG    MCHC 34.6 30.0 - 36.5 g/dL    RDW 12.9 11.5 - 14.5 %    PLATELET 919 797 - 234 K/uL    MPV 9.5 8.9 - 12.9 FL    NRBC 0.0 0  WBC    ABSOLUTE NRBC 0.00 0.00 - 0.01 K/uL    NEUTROPHILS 50 32 - 75 %    LYMPHOCYTES 38 12 - 49 %    MONOCYTES 11 5 - 13 %    EOSINOPHILS 1 0 - 7 %    BASOPHILS 0 0 - 1 %    IMMATURE GRANULOCYTES 0 0.0 - 0.5 %    ABS. NEUTROPHILS 3.8 1.8 - 8.0 K/UL    ABS. LYMPHOCYTES 3.0 0.8 - 3.5 K/UL    ABS. MONOCYTES 0.8 0.0 - 1.0 K/UL    ABS. EOSINOPHILS 0.1 0.0 - 0.4 K/UL    ABS. BASOPHILS 0.0 0.0 - 0.1 K/UL    ABS. IMM. GRANS. 0.0 0.00 - 0.04 K/UL    DF AUTOMATED     METABOLIC PANEL, COMPREHENSIVE    Collection Time: 01/01/20  9:55 PM   Result Value Ref Range    Sodium 139 136 - 145 mmol/L    Potassium 3.1 (L) 3.5 - 5.1 mmol/L    Chloride 105 97 - 108 mmol/L    CO2 30 21 - 32 mmol/L    Anion gap 4 (L) 5 - 15 mmol/L    Glucose 104 (H) 65 - 100 mg/dL    BUN 11 6 - 20 MG/DL    Creatinine 1.09 0.70 - 1.30 MG/DL    BUN/Creatinine ratio 10 (L) 12 - 20      GFR est AA >60 >60 ml/min/1.73m2    GFR est non-AA >60 >60 ml/min/1.73m2    Calcium 9.4 8.5 - 10.1 MG/DL    Bilirubin, total 0.5 0.2 - 1.0 MG/DL    ALT (SGPT) 27 12 - 78 U/L    AST (SGOT) 21 15 - 37 U/L    Alk.  phosphatase 72 45 - 117 U/L    Protein, total 7.7 6.4 - 8.2 g/dL    Albumin 4.5 3.5 - 5.0 g/dL    Globulin 3.2 2.0 - 4.0 g/dL    A-G Ratio 1.4 1.1 - 2.2     ETHYL ALCOHOL    Collection Time: 01/01/20  9:55 PM   Result Value Ref Range    ALCOHOL(ETHYL),SERUM <82 <09 MG/DL   SALICYLATE    Collection Time: 01/01/20  9:55 PM   Result Value Ref Range    Salicylate level <7.4 (L) 2.8 - 20.0 MG/DL   ACETAMINOPHEN    Collection Time: 01/01/20  9:55 PM   Result Value Ref Range    Acetaminophen level <2 (L) 10 - 30 ug/mL URINALYSIS W/ REFLEX CULTURE    Collection Time: 01/01/20  9:55 PM   Result Value Ref Range    Color YELLOW/STRAW      Appearance CLEAR CLEAR      Specific gravity 1.021 1.003 - 1.030      pH (UA) 7.0 5.0 - 8.0      Protein NEGATIVE  NEG mg/dL    Glucose NEGATIVE  NEG mg/dL    Ketone NEGATIVE  NEG mg/dL    Bilirubin NEGATIVE  NEG      Blood NEGATIVE  NEG      Urobilinogen 1.0 0.2 - 1.0 EU/dL    Nitrites NEGATIVE  NEG      Leukocyte Esterase MODERATE (A) NEG      WBC 20-50 0 - 4 /hpf    RBC 0-5 0 - 5 /hpf    Epithelial cells FEW FEW /lpf    Bacteria NEGATIVE  NEG /hpf    UA:UC IF INDICATED URINE CULTURE ORDERED (A) CNI      Hyaline cast 0-2 0 - 5 /lpf   DRUG SCREEN, URINE    Collection Time: 01/01/20  9:55 PM   Result Value Ref Range    AMPHETAMINES NEGATIVE  NEG      BARBITURATES NEGATIVE  NEG      BENZODIAZEPINES NEGATIVE  NEG      COCAINE NEGATIVE  NEG      METHADONE NEGATIVE  NEG      OPIATES NEGATIVE  NEG      PCP(PHENCYCLIDINE) NEGATIVE  NEG      THC (TH-CANNABINOL) NEGATIVE  NEG      Drug screen comment (NOTE)        No orders to display     CT Results  (Last 48 hours)    None          Local Primary Care Physicians   [facility] [phone]  MD Denisha Bazan MD Merita Burke, MD [facility] Doctors [phone]  Alexis Favre, [facility]  MD Diana Rico MD Adelina Dotter, MD [address]  [phone]  MD Liliana Bill MD [facility] [place], [facility] [phone]  MD Ar Colindres MD Marsha Cahill, MD Dione Pilling, MD   [facility] [phone]  MD Tim MONAHAN MD Towanda Linker, NP [address] [facility] [address] [phone]  MD Jean Sanchez MD Gregor Jain, MD Harvin Brawn, MD Hollis Gibbs, MD Truman Or, MD Sherron Brier, MD   [address] [facility] [phone]  Pauline Fall MD [facility] [phone]  Germain Lied, MD Lennox Mimes, NP  MD Gino Maharaj, MD Luis Myers, MD Billy Lennon MD   9025 [facility] [phone]  MD Saul Funesay Pac, FNP  Renetta Lemon, NP  Clement Morales, MD Jo Ann Khalil, MD Gage Alvarez, MD Sergey Dave MD [facility] [phone]  Marianna Payne, MD Brigitte Navarro, MD Andi Grey, MD Anibal Cavanaugh MD   [address] [phone]  Jayde Edward, MD Marcelo Quiles [phone]  Liam Chun, MD Milligan, MD Cassidy Roberts, MD   [facility] [phone]  MD Anastasia Marie, MD Ana Fernandes, MD Sheryle Amble, MD Mike Box, MD Gallito Saul, NP  Carlos Alfred MD [address] [facility]   [phone]  MD Humberto Rene MD Eleanor Crumble, MD     2173 [facility] [phone]  [address], MD Jer Foster, FNP  Ava Roblero, PALyndaC  Ava Roblero, FNP  Ranjan Rose, PALyndaC  MD Juan Plunkett, ANIL Beal, DO   Miscellaneous:  Sahra Pulido MD [facility] Departments   For adult and child immunizations, family planning, TB screening, STD testing and women's health services. [facility]: [place] [phone]     [address]: [address] [phone] 24[address] [facility]        [address] [address]  For primary care services, woman and child wellness, and some clinics providing specialty care. U -- [address] [place] [address] [phone]   [address] [facility] 200 [facility] [phone]   [address] [place] [address] 110 [facility] [place] [address] 1604 [facility] [address]  [phone]   [address] [facility] [address] [address] [place] [phone]   [facility] 81 [facility] [phone]   Antonio Quintanilla [facility] [address] [place] [address] [phone]   [facility]: 2000 [facility] 4100 [facility] 820 [facility] [address]     [place] 3522 6327 [facility]  5850  Community  [phone]   [facility]  [address] ([email or web address])         Sexual Health/Woman Wellness Clinics   For STD/HIV testing and treatment, pregnancy testing and services, men's health, birth control services, LGBT services, and hepatitis/HPV vaccine services. [place] [employer] [place] for [place] [employer] [address]. [facility] 75 [facility] 1579 [address] Akbar Tejada [phone]   [facility] [address] , 5th floor [phone]   Pregnancy [address] for Women [address] [phone]        [place] [address]67 High Blood 454 [facility]   [phone]   [place]   [phone]   Women, Infant and Children's Services: [address] [phone]       [place] of the [address]    [phone]   4801 [facility]   [phone]   [facility] 128       Patient Education        Learning About Depression Screening  What is depression screening? Depression screening is a way to see if you have depression symptoms. It may be done by a doctor or counselor. This screening is often part of a routine checkup. That's because your mental health is just as important as your physical health.   Depression is a medical illness that affects how you feel, think, and act. You may:  · Have less energy. · Lose interest in your daily activities. · Feel sad and grouchy for a long time. Depression is very common. It affects men and women of all ages. Many things can trigger depression. Some people become depressed after they have a stroke or find out they have a major illness like cancer or heart disease. The death of a loved one, a breakup, or changes in the natural brain chemicals may lead to depression. It can run in families. Most experts believe that a combination of family history (a person's genes) and stressful life events can cause depression. What happens during screening? Your doctor may ask about your feelings, any changes in eating habits, your energy level, and your interest in your daily tasks. He or she may ask other questions, such as how well you are sleeping and if you can focus on the things you do. This may be an informal talk between the two of you. Or your doctor may ask you to fill out a quick form and then talk about your answers. Some diseases or changes in your body can cause symptoms that look like depression. So your doctor may do blood tests to help rule out other problems, such as hormone changes, a low thyroid level, or anemia. What happens after screening? If you have signs of depression, your doctor will talk to you about your options. Doctors usually treat depression with medicines or counseling. Often, combining the two works best. Many people don't get help because they think that they'll get over the depression on their own. But people with depression may not get better unless they get treatment. Many people feel embarrassed or ashamed about having depression. But it isn't a sign of personal weakness. It's not a character flaw. A person who is depressed is not \"crazy. \" Depression is caused by changes in the brain.   A serious symptom of depression is thinking about death or suicide. If you or someone you care about talks about this or about feeling hopeless, get help right away. It's important to know that depression can be treated. The first step toward feeling better is often just seeing that the problem exists. Where can you learn more? Go to http://marcos-carlita.info/. Enter T185 in the search box to learn more about \"Learning About Depression Screening. \"  Current as of: May 28, 2019  Content Version: 12.2  © 7230-3488 [employer]. Care instructions adapted under license by [employer] (which disclaims liability or warranty for this information). If you have questions about a medical condition or this instruction, always ask your healthcare professional. [address] any warranty or liability for your use of this information.

## 2020-01-02 NOTE — ED NOTES
Spoke with patient regarding having to change into paper scrubs and explained plan with getting lab work, urine, and speaking with bsmart consult once all of these results came back. Patient mentioned someone saying he would be admitted and did not want that and was very frustrated upon initial assessment and talking with the patient. After explaining to patient what we were going to do (see above) patient appeared less anxious and was more calm and cooperative. Never able to figure out who told the patient they were going to be admitted.

## 2020-01-02 NOTE — BSMART NOTE
Comprehensive Assessment Form Part 1      Section I - Disposition    Axis I - Schizophrenia   Axis II - Deferred  Axis III - Past Medical History      Date Comments   Reactive airway disease [J45.909]     Mental disorder [F99]       Axis IV - Treatment non compliance  [place] V -       The Medical Doctor to Psychiatrist conference was not completed. The Medical Doctor is in agreement with Psychiatrist disposition because of (reason) patient is not seeking an admission and does not require psychiatric admission. The plan is discharge patient. He has appointment with [facility] on Jan 30th and stated he would call to attempt to get earlier appointment to discuss his medications. The ED provider is in agreement with discharging patient. The admitting Diagnosis is Schizophrenia. The Payor source is [email or web address] [facility]. The name of the representative was . This was approved for  days. The authorization number is . Section II - Integrated Summary  Summary:  Patient is 20year old male pt. is schizophrenic.  reports hearing voices and has SI/voices are telling him to hurt himself.  reports taking medications inconsistently. At bedside, patient reported he was having a bad day; episode in which he became irritated. Patient stated his entire day has been out of wack, as reported he was trying to sleep but he could not sleep then he decided to get things done but he felt he could not do that either. Patient stated he called his dad to help him calm down and he told him to go to hospital. Patient reported coming to hospital so he could talk to doctor and calm down. Patient reported feeling better and calm at the time of assessment. Patient denied suicidal and homicidal thoughts. Patient denied current hallucinations but stated he does have history of visual and auditory hallucinations. Patient reported he started back taking his medications about a week now and when he starts it.  He does not initially like how it makes him feel. Patient expressed not always being consistent on his medications. Patient reported he moved back with his mother in August and it has been okay as he gets use to them being back in the same home. Mother reported feeling that the patient is safe with himself and within the home. Patient verbalized feeling safe. Patient reported he is awaiting orientation for a job at [address] [place] [address] [employer]. The patienthas demonstrated mental capacity to provide informed consent. The information is given by the patient and parent  The Chief Complaint is mood changes. The Precipitant Factors are difficulty sleeping was irritated, recently started back on medications. Previous Hospitalizations: yes  The patient has not previously been in restraints. Current Psychiatrist and/or  is [facility]. Lethality Assessment:    The potential for suicide noted by the following: not noted at the time of assessment, previous attempts 2017, overdose and hanging . The potential for homicide is not noted. The patient has not been a perpetrator of sexual or physical abuse. There are not pending charges. The patient is not felt to be at risk for self harm or harm to others. The attending nurse was advised not noted. Section III - Psychosocial  The patient's overall mood and attitude is calm and cooperative. Feelings of helplessness and hopelessness are not observed. Generalized anxiety is not observed. Panic is not observed. Phobias are not observed. Obsessive compulsive tendencies are not observed. Section IV - Mental Status Exam  The patient's appearance shows no evidence of impairment. The patient's behavior shows no evidence of impairment. The patient is oriented to time, place, person and situation. The patient's speech shows no evidence of impairment. The patient's mood is euthymic. The range of affect shows no evidence of impairment. The patient's thought content demonstrates no evidence of impairment.   The thought process shows no evidence of impairment. The patient's perception shows no evidence of impairment. The patient's memory shows no evidence of impairment. The patient's appetite shows no evidence of impairment. The patient's sleep shows no evidence of impairment. The patient's insight shows no evidence of impairment. The patient's judgement shows no evidence of impairment. Section V - Substance Abuse  The patient is not using substances. The patient is using not noted. The patient has experienced the following withdrawal symptoms: N/A. Section VI - Living Arrangements  The patient is single. The patient lives with a parent. The patient has one child. The patient does plan to return home upon discharge. The patient does not have legal issues pending. The patient's source of income comes from family. Moravian and cultural practices have not been voiced at this time. The patient's greatest support comes from parents and this person will be involved with the treatment. The patient has not been in an event described as horrible or outside the realm of ordinary life experience either currently or in the past.  The patient has not been a victim of sexual/physical abuse. Section VII - Other Areas of Clinical Concern  The highest grade achieved is 12th with the overall quality of school experience being described as unknown. The patient is currently unemployed and speaks [place] as a primary language. The patient has no communication impairments affecting communication. The patient's preference for learning can be described as: can read and write adequately.   The patient's hearing is normal.  The patient's vision is normal.      Rafael Rivera MA

## 2020-01-03 LAB
BACTERIA SPEC CULT: NORMAL
CC UR VC: NORMAL
SERVICE CMNT-IMP: NORMAL

## 2020-03-16 ENCOUNTER — OFFICE VISIT (OUTPATIENT)
Dept: URGENT CARE | Age: 20
End: 2020-03-16

## 2020-03-16 VITALS
WEIGHT: 164.9 LBS | HEART RATE: 75 BPM | OXYGEN SATURATION: 100 % | BODY MASS INDEX: 24.42 KG/M2 | SYSTOLIC BLOOD PRESSURE: 112 MMHG | HEIGHT: 69 IN | RESPIRATION RATE: 18 BRPM | DIASTOLIC BLOOD PRESSURE: 62 MMHG | TEMPERATURE: 98.6 F

## 2020-03-16 DIAGNOSIS — R23.8 SKIN IRRITATION: Primary | ICD-10-CM

## 2020-03-16 RX ORDER — SULFAMETHOXAZOLE AND TRIMETHOPRIM 800; 160 MG/1; MG/1
1 TABLET ORAL 2 TIMES DAILY
Qty: 14 TAB | Refills: 0 | Status: SHIPPED | OUTPATIENT
Start: 2020-03-16 | End: 2022-05-10

## 2020-03-16 RX ORDER — MUPIROCIN 20 MG/G
OINTMENT TOPICAL DAILY
Qty: 22 G | Refills: 0 | Status: SHIPPED | OUTPATIENT
Start: 2020-03-16 | End: 2022-05-10

## 2020-03-16 RX ORDER — RISPERIDONE 2 MG/1
TABLET, FILM COATED ORAL
COMMUNITY
Start: 2020-02-26 | End: 2022-05-10

## 2020-03-16 RX ORDER — ARIPIPRAZOLE 30 MG/1
TABLET ORAL
COMMUNITY
Start: 2019-10-10 | End: 2022-05-10

## 2020-03-16 NOTE — PROGRESS NOTES
Diarrhea    The history is provided by the patient. This is a new problem. The current episode started 2 days ago. Episode frequency: 1 time/ day - loose  The problem has not changed since onset. There has been no fever. Pertinent negatives include no abdominal pain, no vomiting, no chills, no headaches and no cough. Risk factors include ill contacts. He has tried nothing for the symptoms. Skin Problem   This is a new problem. The current episode started 2 days ago. The problem occurs constantly. Pertinent negatives include no abdominal pain and no headaches. Associated symptoms comments: Small area of redness and scratch shayna on skin of scrotum  No pain- mild irritation   . Nothing aggravates the symptoms. Nothing relieves the symptoms.         Past Medical History:   Diagnosis Date    Mental disorder     Reactive airway disease         Past Surgical History:   Procedure Laterality Date    HX APPENDECTOMY           Family History   Problem Relation Age of Onset    Seizures Neg Hx     Parkinsonism Neg Hx         Social History     Socioeconomic History    Marital status: SINGLE     Spouse name: Not on file    Number of children: 0    Years of education: Not on file    Highest education level: Not on file   Occupational History    Occupation: student [employer]     Comment: 15-16 11 th grade   Social Needs    Financial resource strain: Not on file    Food insecurity     Worry: Not on file     Inability: Not on file   IntY needs     Medical: Not on file     Non-medical: Not on file   Tobacco Use    Smoking status: Former Smoker     Packs/day: 0.25     Types: Cigarettes     Start date: 6/1/2011    Smokeless tobacco: Never Used   Substance and Sexual Activity    Alcohol use: No     Alcohol/week: 0.0 standard drinks    Drug use: Not Currently     Types: Marijuana     Comment: regular use- weekly since age 14    Sexual activity: Yes     Partners: Female     Birth control/protection: None, Condom   Lifestyle    Physical activity     Days per week: Not on file     Minutes per session: Not on file    Stress: Not on file   Relationships    Social connections     Talks on phone: Not on file     Gets together: Not on file     Attends [employer] service: Not on file     Active member of club or organization: Not on file     Attends meetings of clubs or organizations: Not on file     Relationship status: Not on file    Intimate partner violence     Fear of current or ex partner: Not on file     Emotionally abused: Not on file     Physically abused: Not on file     Forced sexual activity: Not on file   Other Topics Concern    Not on file   Social History Narrative    Live with mom brother and brothers GF    Now lives with Dad step mom ans sister good. HS at [employer], single. ALLERGIES: Patient has no known allergies. Review of Systems   Constitutional: Negative for chills. Respiratory: Negative for cough. Gastrointestinal: Positive for diarrhea. Negative for abdominal pain and vomiting. Neurological: Negative for headaches. All other systems reviewed and are negative. Vitals:    03/16/20 1604   BP: 112/62   Pulse: 75   Resp: 18   Temp: 98.6 °F (37 °C)   SpO2: 100%   Weight: 164 lb 14.4 oz (74.8 kg)   Height: 5' 9\" (1.753 m)       Physical Exam  Vitals signs and nursing note reviewed. Abdominal:      Hernia: There is no hernia in the right inguinal area or left inguinal area. Genitourinary:     Penis: Normal.       Scrotum/Testes:         Right: Tenderness or swelling not present. Left: Tenderness or swelling not present. Comments: Area of superficial redness with skin cracking no tenderness  No rash         MDM    Procedures      ICD-10-CM ICD-9-CM    1. Skin irritation R23.8 709.9     with redness- ?  Local superficial bacterial infection     Use bactrim if not better   Don't wash private area with heavy soapi and prevent scrubbing  Medications Ordered Today   Medications    mupirocin (BACTROBAN) 2 % ointment     Sig: Apply  to affected area daily. Dispense:  22 g     Refill:  0    trimethoprim-sulfamethoxazole (BACTRIM DS, SEPTRA DS) 160-800 mg per tablet     Sig: Take 1 Tab by mouth two (2) times a day. Dispense:  14 Tab     Refill:  0     No results found for any visits on 03/16/20. The patients condition was discussed with the patient and they understand. The patient is to follow up with primary care doctor. If signs and symptoms become worse the pt is to go to the ER. The patient is to take medications as prescribed.

## 2020-03-16 NOTE — PATIENT INSTRUCTIONS
Folliculitis: Care Instructions  Your Care Instructions    Folliculitis (say \"shx-ZSE-nuy-LY-tus\") is an infection of the pouches (follicles) in the skin where hair grows. It can occur on any part of the body, but it is most common on the scalp, face, armpits, and groin. Bacteria, such as those found in a hot tub, can cause folliculitis. Folliculitis begins as a red, tender area near a strand of hair. The skin can itch or burn and may drain pus or blood. Sometimes folliculitis can lead to more serious skin infections. Your doctor usually can treat mild folliculitis with an antibiotic cream or ointment. If you have folliculitis on your scalp, you may use a shampoo that kills bacteria. Antibiotics you take as pills can treat infections deeper in the skin. For stubborn cases of folliculitis, laser treatment may be an option. Laser treatment uses strong beams of light to destroy the hair follicle. But hair will no longer grow in the treated area. Follow-up care is a key part of your treatment and safety. Be sure to make and go to all appointments, and call your doctor if you are having problems. It's also a good idea to know your test results and keep a list of the medicines you take. How can you care for yourself at home? · Take your medicine exactly as prescribed. If your doctor prescribed antibiotics, take them as directed. Do not stop taking them just because you feel better. You need to take the full course of antibiotics. · Use a soap that kills bacteria to wash the infected area. If your scalp or beard is infected, use a shampoo with selenium or propylene glycol. Be careful. Do not scrub too long or too hard. · Mix 1 1/3 cup warm water and 1 tablespoon vinegar. Soak a cloth in the mixture, and place it over the infected skin until it cools off (usually 5 to 10 minutes). You can do this 3 to 6 times a day. · Do not share your razor, towel, or washcloth. That can spread folliculitis.   · Use a new blade in your razor each time you shave to keep from re-infecting your skin. · If you tend to get folliculitis, avoid using hot tubs. They can contain bacteria that cause folliculitis. When should you call for help? Call your doctor now or seek immediate medical care if:    · You have symptoms of infection, such as:  ? Increased pain, swelling, warmth, or redness. ? Red streaks leading from the area. ? Pus draining from the area. ? A fever.    Watch closely for changes in your health, and be sure to contact your doctor if:    · You do not get better as expected. Where can you learn more? Go to http://marcos-carlita.info/  Enter M257 in the search box to learn more about \"Folliculitis: Care Instructions. \"  Current as of: October 30, 2019Content Version: 12.4  © 8629-2482 [employer]wise, Incorporated. Care instructions adapted under license by [employer] (which disclaims liability or warranty for this information). If you have questions about a medical condition or this instruction, always ask your healthcare professional. [address] any warranty or liability for your use of this information.

## 2020-07-18 ENCOUNTER — ED HISTORICAL/CONVERTED ENCOUNTER (OUTPATIENT)
Dept: OTHER | Age: 20
End: 2020-07-18

## 2020-08-08 ENCOUNTER — ED HISTORICAL/CONVERTED ENCOUNTER (OUTPATIENT)
Dept: OTHER | Age: 20
End: 2020-08-08

## 2020-09-27 ENCOUNTER — HOSPITAL ENCOUNTER (EMERGENCY)
Age: 20
Discharge: HOME | End: 2020-09-28
Payer: COMMERCIAL

## 2020-09-27 VITALS
TEMPERATURE: 98.2 F | DIASTOLIC BLOOD PRESSURE: 62 MMHG | SYSTOLIC BLOOD PRESSURE: 114 MMHG | OXYGEN SATURATION: 100 % | BODY MASS INDEX: 22.9 KG/M2 | RESPIRATION RATE: 19 BRPM | WEIGHT: 160 LBS | HEIGHT: 70 IN | HEART RATE: 68 BPM

## 2020-09-27 DIAGNOSIS — Z11.3 SCREEN FOR STD (SEXUALLY TRANSMITTED DISEASE): ICD-10-CM

## 2020-09-27 DIAGNOSIS — L24.9 IRRITANT CONTACT DERMATITIS, UNSPECIFIED TRIGGER: ICD-10-CM

## 2020-09-27 DIAGNOSIS — K64.9 HEMORRHOIDS, UNSPECIFIED HEMORRHOID TYPE: Primary | ICD-10-CM

## 2020-09-27 PROCEDURE — 99282 EMERGENCY DEPT VISIT SF MDM: CPT

## 2020-09-27 PROCEDURE — 81001 URINALYSIS AUTO W/SCOPE: CPT

## 2020-09-27 PROCEDURE — 87491 CHLMYD TRACH DNA AMP PROBE: CPT

## 2020-09-27 RX ORDER — CLOTRIMAZOLE AND BETAMETHASONE DIPROPIONATE 10; .64 MG/G; MG/G
CREAM TOPICAL 2 TIMES DAILY
Qty: 1 TUBE | Refills: 0 | Status: SHIPPED | OUTPATIENT
Start: 2020-09-27 | End: 2022-05-10

## 2020-09-27 RX ORDER — PETROLATUM 42 G/100G
OINTMENT TOPICAL AS NEEDED
Qty: 106 G | Refills: 0 | Status: SHIPPED | OUTPATIENT
Start: 2020-09-27 | End: 2020-10-27

## 2020-09-27 RX ORDER — LIDOCAINE HCL AND HYDROCORTISONE ACETATE 20; 20 MG/G; MG/G
1 CREAM RECTAL 2 TIMES DAILY
Qty: 1 TUBE | Refills: 0 | Status: SHIPPED | OUTPATIENT
Start: 2020-09-27 | End: 2020-10-11

## 2020-09-28 LAB
APPEARANCE UR: CLEAR
BACTERIA URNS QL MICRO: NEGATIVE /HPF
BILIRUB UR QL: NEGATIVE
COLOR UR: ABNORMAL
GLUCOSE UR STRIP.AUTO-MCNC: NEGATIVE MG/DL
HGB UR QL STRIP: ABNORMAL
KETONES UR QL STRIP.AUTO: NEGATIVE MG/DL
LEUKOCYTE ESTERASE UR QL STRIP.AUTO: NEGATIVE
MUCOUS THREADS URNS QL MICRO: ABNORMAL /LPF
NITRITE UR QL STRIP.AUTO: NEGATIVE
PH UR STRIP: 6 [PH] (ref 5–8)
PROT UR STRIP-MCNC: NEGATIVE MG/DL
RBC #/AREA URNS HPF: ABNORMAL /HPF (ref 0–5)
SP GR UR REFRACTOMETRY: 1.03 (ref 1–1.03)
UROBILINOGEN UR QL STRIP.AUTO: 2 EU/DL (ref 0.1–1)
WBC URNS QL MICRO: ABNORMAL /HPF (ref 0–4)

## 2020-09-28 NOTE — ED TRIAGE NOTES
C/o of abdominal pain but states \"its not really that bad\". Denies nausea and vomiting.  States that he would like to also have STD check

## 2020-09-28 NOTE — ED PROVIDER NOTES
EMERGENCY DEPARTMENT HISTORY AND PHYSICAL EXAM      Date: 9/27/2020  Patient Name: Deanna Haile. History of Presenting Illness     Chief Complaint   Patient presents with    Abdominal Pain       History Provided By: Patient    HPI: Deanna Chavez, 23 y.o. male with a past medical history significant No significant past medical history presents to the ED with cc of requesting testing for STD, hemorrhoid treatment, and a rash noted on bilateral wrists elbows and bumps to the left side of his buttocks and lower back. Reports hemorrhoids been chronic denies any bleeding or pain from site however would like some treatment. He also reports being sexual active however denies any burning frequency urgency. He reports knowing that he has been positive for HSV in the past and just wants to have routine checkup for STDs at this time denies needing treatment. He reports the bump on his backside been present for the past 3 to 4 days occasionally will itch however noticed some mild redness to the site. Denies any known drug allergies    There are no other complaints, changes, or physical findings at this time. PCP: Jennifer Hill MD    Current Outpatient Medications   Medication Sig Dispense Refill    mineral oil-hydrophil petrolat (Aquaphor) ointment Apply  to affected area as needed for Skin Irritation or Dry Skin for up to 30 days. 106 g 0    clotrimazole-betamethasone (Lotrisone) topical cream Apply  to affected area two (2) times a day. Apply to affected area 1 Tube 0    ARIPiprazole (ABILIFY) 30 mg tablet       risperiDONE (RisperDAL) 2 mg tablet       mupirocin (BACTROBAN) 2 % ointment Apply  to affected area daily. 22 g 0    trimethoprim-sulfamethoxazole (BACTRIM DS, SEPTRA DS) 160-800 mg per tablet Take 1 Tab by mouth two (2) times a day.  14 Tab 0       Past History     Past Medical History:  Past Medical History:   Diagnosis Date    Mental disorder     Reactive airway disease        Past Surgical History:  Past Surgical History:   Procedure Laterality Date    HX APPENDECTOMY         Family History:  Family History   Problem Relation Age of Onset    Seizures Neg Hx     Parkinsonism Neg Hx        Social History:  Social History     Tobacco Use    Smoking status: Former Smoker     Packs/day: 0.25     Types: Cigarettes     Start date: 6/1/2011    Smokeless tobacco: Never Used   Substance Use Topics    Alcohol use: No     Alcohol/week: 0.0 standard drinks    Drug use: Not Currently     Types: Marijuana     Comment: regular use- weekly since age 14       Allergies:  No Known Allergies      Review of Systems     Review of Systems   Constitutional: Negative. Respiratory: Negative. Cardiovascular: Negative. Gastrointestinal: Negative. Hemorrhoids   Genitourinary: Negative for difficulty urinating, discharge, dysuria, frequency, genital sores, penile pain, penile swelling, scrotal swelling, testicular pain and urgency. Skin: Positive for rash. Physical Exam     Physical Exam  Exam conducted with a chaperone present. Constitutional:       Appearance: He is well-developed. HENT:      Head: Normocephalic and atraumatic. Eyes:      Extraocular Movements: Extraocular movements intact. Cardiovascular:      Rate and Rhythm: Normal rate. Pulmonary:      Effort: Pulmonary effort is normal.      Breath sounds: Normal breath sounds. Abdominal:      General: Abdomen is flat. Bowel sounds are normal.      Palpations: Abdomen is soft. Genitourinary:     Comments: Deferred exam of testicular region however, allowed evaluation of rectum. External Hemorrhoid present. Skin:     General: Skin is warm and dry. Capillary Refill: Capillary refill takes less than 2 seconds. Findings: Erythema and rash present. Rash is scaling. Comments: Red dry inflamed rash noted on bilateral wrist and left lower back    Neurological:      Mental Status: He is alert. Diagnostic Study Results     Labs -   No results found for this or any previous visit (from the past 12 hour(s)). Results for Naveed Toledo (MRN [id number]) as of 10/17/2020 13:58   Ref. Range 9/27/2020 23:15   Color Latest Units:   Yellow/Straw   Appearance Latest Ref Range: Clear   Clear   Specific gravity Latest Ref Range: 1.003 - 1.030   1.030   pH (UA) Latest Ref Range: 5.0 - 8.0   6.0   Protein Latest Ref Range: Negative mg/dL Negative   Glucose Latest Ref Range: Negative mg/dL Negative   Ketone Latest Ref Range: Negative mg/dL Negative   Blood Latest Ref Range: Negative   Small (A)   Bilirubin Latest Ref Range: Negative   Negative   Urobilinogen Latest Ref Range: 0.1 - 1.0 EU/dL 2.0 (H)   Nitrites Latest Ref Range: Negative   Negative   Leukocyte Esterase Latest Ref Range: Negative   Negative   Mucus Latest Units: /lpf 4+   WBC Latest Ref Range: 0 - 4 /hpf 10-20   RBC Latest Ref Range: 0 - 5 /hpf 0-5   Bacteria Latest Ref Range: Negative /hpf Negative   Chlamydia amplified Latest Ref Range: Negative  Negative   N. gonorrhea, amplified Latest Ref Range: Negative  Negative   Sample type Latest Units:   Urine   Comment Unknown Testing performed by the Roche Michael CT/NG method, utilizing PCR       Radiologic Studies -   [unfilled]  CT Results  (Last 48 hours)    None        CXR Results  (Last 48 hours)    None          Medical Decision Making and ED Course   I am the first provider for this patient. I reviewed the vital signs, available nursing notes, past medical history, past surgical history, family history and social history. Vital Signs-Reviewed the patient's vital signs. No data found. Provider Notes (Medical Decision Making):   Pt tested for STD as requested. Denies any symptoms. UA negative, Pt deferred need for treatment at this time just wanted to perform a screening.  Pt given treatment and referral for hemorrhoids as they are chronic not acute and not causing pt any discomfort. Pt given topical steroid cream for contact dermatitis. Appears to be eczema. Pt stable at time of discharge. Verbalized understanding    ED Course:   Initial assessment performed. The patients presenting problems have been discussed, and they are in agreement with the care plan formulated and outlined with them. I have encouraged them to ask questions as they arise throughout their visit. Procedures       ANIL Gill NP        Disposition     DISCHARGE PLAN:  1. Current Discharge Medication List      CONTINUE these medications which have NOT CHANGED    Details   ARIPiprazole (ABILIFY) 30 mg tablet       risperiDONE (RisperDAL) 2 mg tablet       mupirocin (BACTROBAN) 2 % ointment Apply  to affected area daily. Qty: 22 g, Refills: 0      trimethoprim-sulfamethoxazole (BACTRIM DS, SEPTRA DS) 160-800 mg per tablet Take 1 Tab by mouth two (2) times a day. Qty: 14 Tab, Refills: 0           2. Follow-up Information     Follow up With Specialties Details Why Contact Info    Aziza Mcdonald MD Family Medicine Schedule an appointment as soon as possible for a visit   [facility] [place] [facility]  [phone]          3. Return to ED if worse     Diagnosis     Clinical Impression:   1. Hemorrhoids, unspecified hemorrhoid type    2. Screen for STD (sexually transmitted disease)    3. Irritant contact dermatitis, unspecified trigger        Attestations:    ANIL Gill MD    Please note that this dictation was completed with [employer], the computer voice recognition software. Quite often unanticipated grammatical, syntax, homophones, and other interpretive errors are inadvertently transcribed by the computer software. Please disregard these errors. Please excuse any errors that have escaped final proofreading. Thank you.

## 2020-09-30 LAB
C TRACH DNA SPEC QL NAA+PROBE: NEGATIVE
N GONORRHOEA DNA SPEC QL NAA+PROBE: NEGATIVE
SAMPLE TYPE: NORMAL
SERVICE CMNT-IMP: NORMAL
SPECIMEN SOURCE: NORMAL

## 2020-12-10 ENCOUNTER — HOSPITAL ENCOUNTER (EMERGENCY)
Age: 20
Discharge: HOME | End: 2020-12-10
Attending: EMERGENCY MEDICINE
Payer: COMMERCIAL

## 2020-12-10 ENCOUNTER — APPOINTMENT (OUTPATIENT)
Dept: GENERAL RADIOLOGY | Age: 20
End: 2020-12-10
Attending: EMERGENCY MEDICINE
Payer: COMMERCIAL

## 2020-12-10 VITALS
BODY MASS INDEX: 23.24 KG/M2 | WEIGHT: 166 LBS | HEART RATE: 91 BPM | TEMPERATURE: 98.2 F | SYSTOLIC BLOOD PRESSURE: 121 MMHG | DIASTOLIC BLOOD PRESSURE: 73 MMHG | RESPIRATION RATE: 17 BRPM | OXYGEN SATURATION: 98 % | HEIGHT: 71 IN

## 2020-12-10 DIAGNOSIS — R06.00 DYSPNEA, UNSPECIFIED TYPE: Primary | ICD-10-CM

## 2020-12-10 LAB
ATRIAL RATE: 84 BPM
CALCULATED P AXIS, ECG09: 75 DEGREES
CALCULATED R AXIS, ECG10: 52 DEGREES
CALCULATED T AXIS, ECG11: 60 DEGREES
DIAGNOSIS, 93000: NORMAL
P-R INTERVAL, ECG05: 166 MS
Q-T INTERVAL, ECG07: 342 MS
QRS DURATION, ECG06: 80 MS
QTC CALCULATION (BEZET), ECG08: 404 MS
VENTRICULAR RATE, ECG03: 84 BPM

## 2020-12-10 PROCEDURE — 99283 EMERGENCY DEPT VISIT LOW MDM: CPT

## 2020-12-10 PROCEDURE — 71045 X-RAY EXAM CHEST 1 VIEW: CPT

## 2020-12-10 PROCEDURE — 93005 ELECTROCARDIOGRAM TRACING: CPT

## 2020-12-10 NOTE — ED PROVIDER NOTES
EMERGENCY DEPARTMENT HISTORY AND PHYSICAL EXAM        Date: 12/10/2020  Patient Name: Elver Freeman. History of Presenting Illness     Chief Complaint   Patient presents with    Shortness of Breath       History Provided By: Patient    HPI: Elver Freeman., 23 y.o. male with no medical problems who presents with difficulty breathing. States he has been having some difficulty breathing for about 1 month. He has had a mild cough. Denies any fevers or chest pain. PCP: Lisa Osborne MD    Current Outpatient Medications   Medication Sig Dispense Refill    clotrimazole-betamethasone (Lotrisone) topical cream Apply  to affected area two (2) times a day. Apply to affected area 1 Tube 0    ARIPiprazole (ABILIFY) 30 mg tablet       risperiDONE (RisperDAL) 2 mg tablet       mupirocin (BACTROBAN) 2 % ointment Apply  to affected area daily. 22 g 0    trimethoprim-sulfamethoxazole (BACTRIM DS, SEPTRA DS) 160-800 mg per tablet Take 1 Tab by mouth two (2) times a day. 14 Tab 0       Past History     Past Medical History:  Past Medical History:   Diagnosis Date    Mental disorder     Reactive airway disease        Past Surgical History:  Past Surgical History:   Procedure Laterality Date    HX APPENDECTOMY         Family History:  Family History   Problem Relation Age of Onset    Seizures Neg Hx     Parkinsonism Neg Hx        Social History:  Social History     Tobacco Use    Smoking status: Current Every Day Smoker     Packs/day: 0.25     Types: Cigarettes     Start date: 6/1/2011    Smokeless tobacco: Never Used   Substance Use Topics    Alcohol use: No     Alcohol/week: 0.0 standard drinks    Drug use: Not Currently     Types: Marijuana     Comment: regular use- weekly since age 14       Allergies:  No Known Allergies    Review of Systems   Review of Systems   Constitutional: Negative for fever. HENT: Negative for congestion. Eyes: Negative for visual disturbance.    Respiratory: Positive for cough and shortness of breath. Cardiovascular: Negative for chest pain. Gastrointestinal: Negative for abdominal pain. Genitourinary: Negative for dysuria. Musculoskeletal: Negative for arthralgias. Skin: Negative for rash. Neurological: Negative for headaches. Physical Exam   General: No acute distress. Well-nourished. Skin: No rash. Head: Normocephalic. Atraumatic. Eye: No proptosis or conjunctival injections. Respiratory: No apparent respiratory distress. Gastrointestinal: Nondistended. Musculoskeletal: No obvious bony deformities. Psychiatric: Cooperative. Appropriate mood and affect. Diagnostic Study Results     Labs -   No results found for this or any previous visit (from the past 24 hour(s)). Radiologic Studies -   XR CHEST PORT   Final Result   Impression: No acute pulmonary process. CT Results  (Last 48 hours)    None        CXR Results  (Last 48 hours)               12/10/20 0700  XR CHEST PORT Final result    Impression:  Impression: No acute pulmonary process. Narrative:  Chest, frontal view, 12/10/2020       History: Shortness of breath. Comparison: Chest 10/25/2029. Findings: The cardiac silhouette is within normal limits. The lungs are   adequately expanded. No hydrostatic edema is present. No focal consolidation,   pleural effusions or pneumothorax is identified. No acute osseous findings are   definitively seen. Medical Decision Making and ED Course     I reviewed the available vital signs, nursing notes, past medical history, past surgical history, family history, and social history. Vital Signs - Reviewed the patient's vital signs. Patient Vitals for the past 12 hrs:   Temp Pulse Resp BP SpO2   12/10/20 0715 -- -- -- -- 98 %   12/10/20 0627 98.2 °F (36.8 °C) 91 17 121/73 98 %       EKG interpretation (6867):  Normal sinus rhythm at 84 bpm.  Normal RI interval, QRS duration, QTc interval.  No ST segment abnormalities. Normal axis. No T wave inversions. Medical Decision Making:   Presented with dyspnea. The differential diagnosis is anxiety, reactive airway disease, pneumonia. Chest x-ray and EKG both unremarkable. Patient is reassured. Lungs are clear and I hear no wheezing. He is in no acute distress. Patient discharged in good condition with return precautions. I do not have concern for ACS, COVID-19, or pneumonia. Disposition     Discharged    DISCHARGE PLAN:  1. Current Discharge Medication List      CONTINUE these medications which have NOT CHANGED    Details   clotrimazole-betamethasone (Lotrisone) topical cream Apply  to affected area two (2) times a day. Apply to affected area  Qty: 1 Tube, Refills: 0      ARIPiprazole (ABILIFY) 30 mg tablet       risperiDONE (RisperDAL) 2 mg tablet       mupirocin (BACTROBAN) 2 % ointment Apply  to affected area daily. Qty: 22 g, Refills: 0      trimethoprim-sulfamethoxazole (BACTRIM DS, SEPTRA DS) 160-800 mg per tablet Take 1 Tab by mouth two (2) times a day. Qty: 14 Tab, Refills: 0           2. Follow-up Information     Follow up With Specialties Details Why [address] [facility] EMERGENCY DEPT Emergency Medicine Go today As soon as possible if symptoms worsen [facility] [place] [id number] [phone]    Primary care doctor  Schedule an appointment as soon as possible for a visit in 3 days          3. Return to ED if worse     Diagnosis     Clinical impression:   1. Dyspnea, unspecified type       Attestation:  Please note that this dictation was completed with [employer], the computer voice recognition software. Quite often unanticipated grammatical, syntax, homophones, and other interpretive errors are inadvertently transcribed by the computer software. Please disregard these errors. Please excuse any errors that have escaped final proofreading. Thank you.   Obey Tam, DO

## 2020-12-10 NOTE — ED NOTES
When attempting to discharge pt, pt mother reported that she would need to call him back in bc he had left the treatment area and stepped outside .

## 2020-12-10 NOTE — LETTER
[address] [place] [address] EMERGENCY DEPT 
[facility] [address] [address] 
[phone] Work/School Note Date: 12/10/2020 To Whom It May concern: 
 
Manual Arms. was seen and treated today in the emergency room by the following provider(s): 
Attending Provider: Luis Angel Boone DO. Manual Arms. is excused from work/school on 12/10/20 and 12/11/20. He is medically clear to return to work/school on 12/12/2020. Sincerely, 
 
 
 
Dr Deanne Bingham

## 2020-12-10 NOTE — ED TRIAGE NOTES
Patient states \"I would like an X ray of my whole body. \" States he is mostly concerned with wanting a chest x ray. States he has had some SOB.

## 2020-12-11 ENCOUNTER — PATIENT OUTREACH (OUTPATIENT)
Dept: OTHER | Age: 20
End: 2020-12-11

## 2020-12-11 NOTE — PROGRESS NOTES
Patient on report as eligible for Case Management. Left discreet message on voicemail with this CM contact information. Will attempt to contact again to offer [address] Management services.

## 2020-12-15 ENCOUNTER — PATIENT OUTREACH (OUTPATIENT)
Dept: OTHER | Age: 20
End: 2020-12-15

## 2020-12-15 NOTE — LETTER
Mr. Sandra Soto [phone] Dear Marzena Mahoney, My name is Melanie Soriano LPN, Employee Care Manager for [employer] and I have been trying to reach you, for follow up after your recent ER visit. The Employee Care Management [facility]) program is a free-of-charge confidential service provided to our employees and their family members covered by the [facility]. The program will provide an employee and his/her family with the [employer]' expertise to assist in navigating the health care delivery system, provider services, and their overall care needsso as to assure and improve health care interactions and enhance the quality of life. This program is designed to provide you with the opportunity to have a [employer] care manager partner with you for the following services: 
 
 1) when you come home from the hospital or emergency room 2) when help is needed to manage your disease 3) when you need assistance coordinating services or appointments [employer] is dedicated to empowering the good health of its community and improving the quality of care and care experiences for employees and their families. We are committed to safeguarding patient confidentiality and privacy, assuring that every employee has the respect he or she deserves in managing their health. The information shared with your care manager will not be shared with anyone else aside from those you identify as part of your care team, and will only be used to assist you with any identified care needs. Please contact me if you would like this service provided to you. Sincerely, Melanie Soriano LPN  [facility] [facility] Care Coordinator [address] [place] [address], [address] [place] [address] [facility] Office Cell [phone] Fax [email or web address]

## 2020-12-15 NOTE — PROGRESS NOTES
Patient identified as eligible for [address] [place] [address] services. Second telephone outreach attempted. Left discreet voicemail with this CM confidential contact information. Will send UTR letter.

## 2020-12-30 ENCOUNTER — HOSPITAL ENCOUNTER (EMERGENCY)
Age: 20
Discharge: LEFT BEFORE DISCHARGE | End: 2020-12-30
Attending: EMERGENCY MEDICINE
Payer: COMMERCIAL

## 2020-12-30 VITALS
DIASTOLIC BLOOD PRESSURE: 71 MMHG | TEMPERATURE: 98.1 F | WEIGHT: 169.97 LBS | BODY MASS INDEX: 25.18 KG/M2 | HEART RATE: 78 BPM | RESPIRATION RATE: 18 BRPM | SYSTOLIC BLOOD PRESSURE: 139 MMHG | HEIGHT: 69 IN | OXYGEN SATURATION: 100 %

## 2020-12-30 PROCEDURE — 75810000275 HC EMERGENCY DEPT VISIT NO LEVEL OF CARE

## 2021-01-11 ENCOUNTER — PATIENT OUTREACH (OUTPATIENT)
Dept: OTHER | Age: 21
End: 2021-01-11

## 2021-12-10 ENCOUNTER — HOSPITAL ENCOUNTER (EMERGENCY)
Age: 21
Discharge: HOME | End: 2021-12-10

## 2021-12-10 VITALS
RESPIRATION RATE: 16 BRPM | HEIGHT: 70 IN | OXYGEN SATURATION: 99 % | HEART RATE: 68 BPM | BODY MASS INDEX: 24.34 KG/M2 | SYSTOLIC BLOOD PRESSURE: 129 MMHG | TEMPERATURE: 98.5 F | DIASTOLIC BLOOD PRESSURE: 81 MMHG | WEIGHT: 170 LBS

## 2021-12-10 DIAGNOSIS — K08.89 PAIN, DENTAL: Primary | ICD-10-CM

## 2021-12-10 PROCEDURE — 74011250637 HC RX REV CODE- 250/637: Performed by: PHYSICIAN ASSISTANT

## 2021-12-10 PROCEDURE — 99283 EMERGENCY DEPT VISIT LOW MDM: CPT

## 2021-12-10 RX ORDER — CHLORHEXIDINE GLUCONATE 1.2 MG/ML
15 RINSE ORAL EVERY 12 HOURS
Qty: 420 ML | Refills: 0 | Status: SHIPPED | OUTPATIENT
Start: 2021-12-10 | End: 2021-12-24

## 2021-12-10 RX ORDER — HYDROCODONE BITARTRATE AND ACETAMINOPHEN 5; 325 MG/1; MG/1
1 TABLET ORAL
Qty: 3 TABLET | Refills: 0 | Status: SHIPPED | OUTPATIENT
Start: 2021-12-10 | End: 2021-12-13

## 2021-12-10 RX ORDER — IBUPROFEN 600 MG/1
600 TABLET ORAL
Qty: 20 TABLET | Refills: 0 | Status: SHIPPED | OUTPATIENT
Start: 2021-12-10 | End: 2021-12-17

## 2021-12-10 RX ORDER — HYDROCODONE BITARTRATE AND ACETAMINOPHEN 5; 325 MG/1; MG/1
1 TABLET ORAL
Status: COMPLETED | OUTPATIENT
Start: 2021-12-10 | End: 2021-12-10

## 2021-12-10 RX ORDER — PENICILLIN V POTASSIUM 500 MG/1
500 TABLET, FILM COATED ORAL 3 TIMES DAILY
Qty: 21 TABLET | Refills: 0 | Status: SHIPPED | OUTPATIENT
Start: 2021-12-10 | End: 2021-12-17

## 2021-12-10 RX ADMIN — HYDROCODONE BITARTRATE AND ACETAMINOPHEN 1 TABLET: 5; 325 TABLET ORAL at 15:42

## 2021-12-10 NOTE — ED TRIAGE NOTES
Pt with top left dental pain that began a month ago. Pain worse yesterday. States used orajel with some relief today.

## 2021-12-10 NOTE — Clinical Note
[facility] EMERGENCY DEPT  [address] [place] [id number]  [phone]    Work/School Note    Date: 12/10/2021    To Whom It May concern:      Chika Garcia was seen and treated today in the emergency room by the following provider(s):  Physician Assistant: Saddie Alpers, PA. Chika Garcia is excused from work/school on 12/10/21. He is clear to return to work/school on 12/11/21.         Sincerely,          TOÑO Li

## 2021-12-10 NOTE — ED PROVIDER NOTES
EMERGENCY DEPARTMENT HISTORY AND PHYSICAL EXAM      Date: 12/10/2021  Patient Name: Angelina Mora. History of Presenting Illness     Chief Complaint   Patient presents with    Dental Pain       History Provided By: Patient    HPI: Angelina Mora., 24 y.o. male with a past medical history significant for psychiatric disorder who presents to the ED with cc of gradually worsening dental pain x1 day. Pain is in the left upper molar area. Pain exacerbated palpation and alleviated with rest.  He has not taken any medications for symptoms. Has had similar pain in the past and was told that pain was due to wisdom teeth. Attempted to make appointment with dentist recently but states he cannot afford the visit. Patient denies fever, chills, chest pain, shortness of breath, nausea, vomiting, diarrhea, hoarseness, voice changes, tongue swelling, difficulty swallowing, wheezing, ear pain      There are no other complaints, changes, or physical findings at this time. PCP: Miguel Ward MD    No current facility-administered medications on file prior to encounter. Current Outpatient Medications on File Prior to Encounter   Medication Sig Dispense Refill    clotrimazole-betamethasone (Lotrisone) topical cream Apply  to affected area two (2) times a day. Apply to affected area 1 Tube 0    ARIPiprazole (ABILIFY) 30 mg tablet       risperiDONE (RisperDAL) 2 mg tablet       mupirocin (BACTROBAN) 2 % ointment Apply  to affected area daily. 22 g 0    trimethoprim-sulfamethoxazole (BACTRIM DS, SEPTRA DS) 160-800 mg per tablet Take 1 Tab by mouth two (2) times a day.  14 Tab 0       Past History     Past Medical History:  Past Medical History:   Diagnosis Date    Mental disorder     Reactive airway disease        Past Surgical History:  Past Surgical History:   Procedure Laterality Date    HX APPENDECTOMY         Family History:  Family History   Problem Relation Age of Onset    Seizures Neg Hx     Parkinsonism Neg Hx        Social History:  Social History     Tobacco Use    Smoking status: Current Every Day Smoker     Packs/day: 0.25     Types: Cigarettes     Start date: 6/1/2011    Smokeless tobacco: Never Used   Substance Use Topics    Alcohol use: No     Alcohol/week: 0.0 standard drinks    Drug use: Not Currently     Types: Marijuana     Comment: regular use- weekly since age 14       Allergies:  No Known Allergies      Review of Systems     Review of Systems   Constitutional: Negative for chills, diaphoresis, fatigue and fever. HENT: Positive for dental problem. Negative for congestion, drooling, ear discharge, ear pain, facial swelling, postnasal drip, rhinorrhea, sinus pressure, sore throat, trouble swallowing and voice change. Eyes: Negative for discharge and redness. Respiratory: Negative for cough, choking, chest tightness, shortness of breath and wheezing. Cardiovascular: Negative for chest pain and palpitations. Gastrointestinal: Negative for abdominal pain, diarrhea, nausea and vomiting. Genitourinary: Negative. Musculoskeletal: Negative for neck pain and neck stiffness. Skin: Negative for pallor and rash. Neurological: Negative for headaches. Psychiatric/Behavioral: Negative. All other systems reviewed and are negative. Physical Exam     Physical Exam  Vitals and nursing note reviewed. Constitutional:       General: He is not in acute distress. Appearance: Normal appearance. He is not ill-appearing, toxic-appearing or diaphoretic. HENT:      Head: Normocephalic and atraumatic. Jaw: There is normal jaw occlusion. No trismus, tenderness, swelling, pain on movement or malocclusion. Right Ear: Tympanic membrane, ear canal and external ear normal.      Left Ear: Ear canal and external ear normal.      Nose: Nose normal. No nasal deformity, congestion or rhinorrhea. Right Sinus: No maxillary sinus tenderness or frontal sinus tenderness. Left Sinus: No maxillary sinus tenderness or frontal sinus tenderness. Mouth/Throat:      Lips: No lesions. Mouth: Mucous membranes are moist. No injury, oral lesions or angioedema. Dentition: Does not have dentures. Dental tenderness and dental caries present. No gingival swelling, dental abscesses or gum lesions. Tongue: No lesions. Tongue does not deviate from midline. Palate: No mass and lesions. Pharynx: Oropharynx is clear. Uvula midline. No pharyngeal swelling, oropharyngeal exudate, posterior oropharyngeal erythema or uvula swelling. Tonsils: No tonsillar exudate or tonsillar abscesses. Comments: Tenderness to L upper molar with mild gingival swelling  No lingual/sublingual induration or erythema  No tripoding  Eyes:      Conjunctiva/sclera: Conjunctivae normal.      Pupils: Pupils are equal, round, and reactive to light. Cardiovascular:      Rate and Rhythm: Normal rate and regular rhythm. Heart sounds: No murmur heard. No friction rub. No gallop. Pulmonary:      Effort: Pulmonary effort is normal. No respiratory distress. Breath sounds: Normal breath sounds. No stridor. No wheezing, rhonchi or rales. Chest:      Chest wall: No tenderness. Musculoskeletal:         General: No swelling or deformity. Normal range of motion. Cervical back: Normal range of motion and neck supple. No rigidity. No muscular tenderness. Lymphadenopathy:      Cervical: No cervical adenopathy. Skin:     General: Skin is warm and dry. Capillary Refill: Capillary refill takes less than 2 seconds. Coloration: Skin is not jaundiced or pale. Findings: No rash. Neurological:      General: No focal deficit present. Mental Status: He is alert and oriented to person, place, and time. Psychiatric:         Mood and Affect: Mood normal.         Behavior: Behavior normal.         Thought Content:  Thought content normal.         Judgment: Judgment normal. Lab and Diagnostic Study Results     Labs -   No results found for this or any previous visit (from the past 12 hour(s)). Radiologic Studies -none    Medical Decision Making   - I am the first provider for this patient. - I reviewed the vital signs, available nursing notes, past medical history, past surgical history, family history and social history. - Initial assessment performed. The patients presenting problems have been discussed, and they are in agreement with the care plan formulated and outlined with them. I have encouraged them to ask questions as they arise throughout their visit. Vital Signs-Reviewed the patient's vital signs. Patient Vitals for the past 12 hrs:   Temp Pulse Resp BP SpO2   12/10/21 1454 98.5 °F (36.9 °C) 68 16 129/81 99 %       Records Reviewed: Nursing Notes and Old Medical Records    The patient presents with dental pain with a differential diagnosis of dental caries, dental abscess, gingivitis, Andrew's angina        ED Course:          Provider Notes (Medical Decision Making):     MDM  Number of Diagnoses or Management Options  Pain, dental  Diagnosis management comments:     76-year-old male with dental pain. Examination consistent with mild gingival infection. Patient has dental caries. No evidence of severe infection, airway compromise, George's angina. Discharge home with analgesic, antibiotic, Peridex. Provided with [facility] dental clinic information. Patient afebrile, nontoxic appearing, stable VS, tolerating PO. Reviewed care plan with patient. . Return precautions to ED discussed if symptoms worsen. Patient agreeable with plan of care. Amount and/or Complexity of Data Reviewed  Review and summarize past medical records: yes    Patient Progress  Patient progress: stable           Disposition   Disposition: DC- Adult Discharges: All of the diagnostic tests were reviewed and questions answered.  Diagnosis, care plan and treatment options were discussed. The patient understands the instructions and will follow up as directed. The patients results have been reviewed with them. They have been counseled regarding their diagnosis. The patient verbally convey understanding and agreement of the signs, symptoms, diagnosis, treatment and prognosis and additionally agrees to follow up as recommended with their PCP in 24 - 48 hours. They also agree with the care-plan and convey that all of their questions have been answered. I have also put together some discharge instructions for them that include: 1) educational information regarding their diagnosis, 2) how to care for their diagnosis at home, as well a 3) list of reasons why they would want to return to the ED prior to their follow-up appointment, should their condition change. Discharged    DISCHARGE PLAN:  1. Current Discharge Medication List      CONTINUE these medications which have NOT CHANGED    Details   clotrimazole-betamethasone (Lotrisone) topical cream Apply  to affected area two (2) times a day. Apply to affected area  Qty: 1 Tube, Refills: 0      ARIPiprazole (ABILIFY) 30 mg tablet       risperiDONE (RisperDAL) 2 mg tablet       mupirocin (BACTROBAN) 2 % ointment Apply  to affected area daily. Qty: 22 g, Refills: 0      trimethoprim-sulfamethoxazole (BACTRIM DS, SEPTRA DS) 160-800 mg per tablet Take 1 Tab by mouth two (2) times a day. Qty: 14 Tab, Refills: 0           2. Follow-up Information     Follow up With Specialties Details Why [facility] Call in 1 day  [address] [facility] [address] [place] [address] [facility]    [address]  Schedule an appointment as soon as possible for a visit   [address], [address]  [phone]        3. Return to ED if worse   4.    Discharge Medication List as of 12/10/2021  4:48 PM      START taking these medications    Details   penicillin v potassium (VEETID) 500 mg tablet Take 1 Tablet by mouth three (3) times daily for 7 days. , Normal, Disp-21 Tablet, R-0      chlorhexidine (Peridex) 0.12 % solution 15 mL by Swish and Spit route every twelve (12) hours for 14 days. , Normal, Disp-420 mL, R-0      ibuprofen (MOTRIN) 600 mg tablet Take 1 Tablet by mouth every six (6) hours as needed for Pain for up to 7 days. , Normal, Disp-20 Tablet, R-0      HYDROcodone-acetaminophen (NORCO) 5-325 mg per tablet Take 1 Tablet by mouth every six (6) hours as needed for Pain for up to 3 days. Max Daily Amount: 4 Tablets., Normal, Disp-3 Tablet, R-0         CONTINUE these medications which have NOT CHANGED    Details   clotrimazole-betamethasone (Lotrisone) topical cream Apply  to affected area two (2) times a day. Apply to affected area, Normal, Disp-1 Tube,R-0      ARIPiprazole (ABILIFY) 30 mg tablet Historical Med      risperiDONE (RisperDAL) 2 mg tablet Historical Med      mupirocin (BACTROBAN) 2 % ointment Apply  to affected area daily. , Normal, Disp-22 g, R-0      trimethoprim-sulfamethoxazole (BACTRIM DS, SEPTRA DS) 160-800 mg per tablet Take 1 Tab by mouth two (2) times a day., Print, Disp-14 Tab, R-0               Diagnosis     Clinical Impression:   1. Pain, dental        Attestations:    TOÑO Vega    Please note that this dictation was completed with Dashbell, the [employer] voice recognition software. Quite often unanticipated grammatical, syntax, homophones, and other interpretive errors are inadvertently transcribed by the computer software. Please disregard these errors. Please excuse any errors that have escaped final proofreading. Thank you.

## 2021-12-10 NOTE — Clinical Note
[facility] EMERGENCY DEPT  [address] [facility] [id number]  [phone]    Work/School Note    Date: 12/10/2021    To Whom It May concern:      Domingo Collazo was seen and treated today in the emergency room by the following provider(s):  Physician Assistant: TOÑO Ledesma. Domingo Collazo is excused from work/school on 12/10/21. He is clear to return to work/school on 12/11/21.         Sincerely,          Gabino Garza

## 2021-12-13 ENCOUNTER — PATIENT OUTREACH (OUTPATIENT)
Dept: OTHER | Age: 21
End: 2021-12-13

## 2021-12-13 NOTE — PROGRESS NOTES
Patient on report as eligible for Case Management. Left discreet message on voicemail with this CM contact information. Will attempt to contact again to offer [phone] Management services.

## 2021-12-15 ENCOUNTER — PATIENT OUTREACH (OUTPATIENT)
Dept: OTHER | Age: 21
End: 2021-12-15

## 2021-12-15 NOTE — LETTER
12/15/2021 1:42 PM    Mr. Gloria Naqvi [id number]          Dear Mr. Meena Stacy. My name is Deedee Serrano, Associate Care Manager for [address] [place] [address] and I have been trying to reach you. The Associate Care Management (ACM) program is a free-of-charge confidential service provided to our associates and their family members covered by the [facility]. The program will provide an associate and his/her family with the [facility] expertise to assist in navigating the health care delivery system, provider services, and their overall care needsso as to assure and improve health care interactions and enhance the quality of life. This program is designed to provide you with the opportunity to have a [employer] partner with you for the following services:     1) when you come home from the hospital or emergency room   2) when help is needed to manage your disease   3) when you need assistance coordinating services or appointments  4) when you need additional education, resources or assistance reaching your Be Well Health Program goals/requirements such as Be Well With Diabetes    [facility] is dedicated to empowering the good health of its community and improving the quality of care and care experiences for associates and their families. We are committed to safeguarding patient confidentiality and privacy, assuring that every associate has the respect he or she deserves in managing their health. The information shared with your care manager will not be shared with anyone else aside from those you identify as part of your care team, and will only be used to assist you with any identified care needs. Please contact me if you would like this service provided to you.      Sincerely,  Deedee Serrano RN    [address] [place] [address] [address]  [phone]  [facility] [phone] Fax [email or web address]

## 2021-12-15 NOTE — PROGRESS NOTES
Patient identified as eligible for [address] [place] [address] services. Second telephone outreach attempted. Left discreet voicemail with this CM confidential contact information. Will send UTR letter.

## 2021-12-18 ENCOUNTER — HOSPITAL ENCOUNTER (EMERGENCY)
Age: 21
Discharge: HOME | End: 2021-12-18

## 2021-12-18 VITALS
SYSTOLIC BLOOD PRESSURE: 104 MMHG | WEIGHT: 170 LBS | BODY MASS INDEX: 24.34 KG/M2 | TEMPERATURE: 99.2 F | RESPIRATION RATE: 16 BRPM | OXYGEN SATURATION: 98 % | HEART RATE: 86 BPM | DIASTOLIC BLOOD PRESSURE: 65 MMHG | HEIGHT: 70 IN

## 2021-12-18 DIAGNOSIS — J06.9 VIRAL URI: Primary | ICD-10-CM

## 2021-12-18 LAB
COVID-19 RAPID TEST, COVR: NOT DETECTED
FLUAV AG NPH QL IA: NEGATIVE
FLUBV AG NOSE QL IA: NEGATIVE
SPECIMEN SOURCE: NORMAL

## 2021-12-18 PROCEDURE — 36415 COLL VENOUS BLD VENIPUNCTURE: CPT

## 2021-12-18 PROCEDURE — 99283 EMERGENCY DEPT VISIT LOW MDM: CPT

## 2021-12-18 PROCEDURE — 87804 INFLUENZA ASSAY W/OPTIC: CPT

## 2021-12-18 PROCEDURE — 74011250637 HC RX REV CODE- 250/637: Performed by: PHYSICIAN ASSISTANT

## 2021-12-18 PROCEDURE — 87635 SARS-COV-2 COVID-19 AMP PRB: CPT

## 2021-12-18 RX ORDER — IBUPROFEN 800 MG/1
800 TABLET ORAL
Status: COMPLETED | OUTPATIENT
Start: 2021-12-18 | End: 2021-12-18

## 2021-12-18 RX ORDER — ACETAMINOPHEN 500 MG
1000 TABLET ORAL
Status: COMPLETED | OUTPATIENT
Start: 2021-12-18 | End: 2021-12-18

## 2021-12-18 RX ORDER — ONDANSETRON 4 MG/1
4 TABLET, ORALLY DISINTEGRATING ORAL
Status: COMPLETED | OUTPATIENT
Start: 2021-12-18 | End: 2021-12-18

## 2021-12-18 RX ADMIN — IBUPROFEN 800 MG: 800 TABLET, FILM COATED ORAL at 12:14

## 2021-12-18 RX ADMIN — ACETAMINOPHEN 1000 MG: 500 TABLET ORAL at 12:14

## 2021-12-18 RX ADMIN — ONDANSETRON 4 MG: 4 TABLET, ORALLY DISINTEGRATING ORAL at 12:14

## 2021-12-18 NOTE — ED PROVIDER NOTES
EMERGENCY DEPARTMENT HISTORY AND PHYSICAL EXAM      Date: 12/18/2021  Patient Name: Deanna Godoy. History of Presenting Illness     Chief Complaint   Patient presents with    Vomiting       History Provided By: Patient    HPI: Deanna Godoy., 24 y.o. male with No significant past medical history presents to the ED with cc of fever, chills, and body aches x 2 days. He also reports 2 episodes of emesis this morning but denies any nausea currently. Denies any abdominal pain, diarrhea, constipation, hematemesis, flank pain, difficulty urinating, dysuria, hematuria. Denies any known sick contacts. Pt also denies treating sx's with anything since onset. He reports otherwise being well and has no additional concerns. Pt specifically denies any chest pain, shortness of breath, palpitations, sore throat, headaches, light headedness, dizziness, numbness, weakness, diaphoresis, or rash. There are no other complaints, changes, or physical findings at this time. PCP: Gayle Jay MD    No current facility-administered medications on file prior to encounter. Current Outpatient Medications on File Prior to Encounter   Medication Sig Dispense Refill    chlorhexidine (Peridex) 0.12 % solution 15 mL by Swish and Spit route every twelve (12) hours for 14 days. 420 mL 0    clotrimazole-betamethasone (Lotrisone) topical cream Apply  to affected area two (2) times a day. Apply to affected area 1 Tube 0    ARIPiprazole (ABILIFY) 30 mg tablet       risperiDONE (RisperDAL) 2 mg tablet       mupirocin (BACTROBAN) 2 % ointment Apply  to affected area daily. 22 g 0    trimethoprim-sulfamethoxazole (BACTRIM DS, SEPTRA DS) 160-800 mg per tablet Take 1 Tab by mouth two (2) times a day.  14 Tab 0       Past History     Past Medical History:  Past Medical History:   Diagnosis Date    Mental disorder     Reactive airway disease        Past Surgical History:  Past Surgical History:   Procedure Laterality Date  HX APPENDECTOMY         Family History:  Family History   Problem Relation Age of Onset    Seizures Neg Hx     Parkinsonism Neg Hx        Social History:  Social History     Tobacco Use    Smoking status: Current Every Day Smoker     Packs/day: 0.25     Types: Cigarettes     Start date: 6/1/2011    Smokeless tobacco: Never Used   Substance Use Topics    Alcohol use: No     Alcohol/week: 0.0 standard drinks    Drug use: Not Currently     Types: Marijuana     Comment: regular use- weekly since age 14       Allergies:  No Known Allergies      Review of Systems     Review of Systems   Constitutional: Positive for chills and fever. Negative for diaphoresis. HENT: Positive for congestion. Negative for rhinorrhea, sore throat and trouble swallowing. Eyes: Negative. Negative for pain. Respiratory: Negative. Negative for cough, chest tightness, shortness of breath and wheezing. Cardiovascular: Negative. Negative for chest pain and palpitations. Gastrointestinal: Positive for nausea and vomiting. Negative for abdominal pain, blood in stool, constipation and diarrhea. Genitourinary: Negative. Negative for difficulty urinating, dysuria, flank pain, frequency and hematuria. Musculoskeletal: Positive for myalgias. Skin: Negative. Negative for rash. Neurological: Negative. Negative for dizziness, weakness, light-headedness, numbness and headaches. Psychiatric/Behavioral: Negative. All other systems reviewed and are negative. Physical Exam     Physical Exam  Vitals and nursing note reviewed. Constitutional:       General: He is not in acute distress. Appearance: Normal appearance. He is not ill-appearing or toxic-appearing. HENT:      Head: Normocephalic and atraumatic. Mouth/Throat:      Mouth: Mucous membranes are moist.   Eyes:      Extraocular Movements: Extraocular movements intact.       Conjunctiva/sclera: Conjunctivae normal.      Pupils: Pupils are equal, round, and reactive to light. Cardiovascular:      Rate and Rhythm: Normal rate and regular rhythm. Pulses: Normal pulses. Heart sounds: Normal heart sounds, S1 normal and S2 normal. No murmur heard. No friction rub. No gallop. Pulmonary:      Effort: Pulmonary effort is normal. No respiratory distress. Breath sounds: Normal breath sounds. No wheezing, rhonchi or rales. Abdominal:      General: Bowel sounds are normal. There is no distension. Palpations: Abdomen is soft. Tenderness: There is no abdominal tenderness. There is no right CVA tenderness, left CVA tenderness, guarding or rebound. Negative signs include Camara's sign and McBurney's sign. Musculoskeletal:         General: No tenderness. Cervical back: Neck supple. No tenderness. Right lower leg: No edema. Left lower leg: No edema. Skin:     General: Skin is warm and dry. Capillary Refill: Capillary refill takes less than 2 seconds. Findings: No rash. Neurological:      General: No focal deficit present. Mental Status: He is alert and oriented to person, place, and time. Sensory: Sensation is intact. Motor: Motor function is intact. Gait: Gait is intact. Psychiatric:         Mood and Affect: Mood normal.         Behavior: Behavior normal.         Thought Content: Thought content normal.         Lab and Diagnostic Study Results     Labs -     No results found for this or any previous visit (from the past 12 hour(s)). Radiologic Studies -   @lastxrresult@  CT Results  (Last 48 hours)    None        CXR Results  (Last 48 hours)    None            Medical Decision Making   - I am the first provider for this patient. - I reviewed the vital signs, available nursing notes, past medical history, past surgical history, family history and social history. - Initial assessment performed.  The patients presenting problems have been discussed, and they are in agreement with the care plan formulated and outlined with them. I have encouraged them to ask questions as they arise throughout their visit. Vital Signs-Reviewed the patient's vital signs. No data found. Records Reviewed: Nursing Notes and Old Medical Records       Provider Notes (Medical Decision Making):     MDM  Number of Diagnoses or Management Options  Viral URI  Diagnosis management comments: Pt presents with acute URI symptoms including congestion, fever, chills, and body aches. He denies cough, dyspnea, chest pain or wheezing. Pt is well-appearing with stable vitals and benign exam; symptoms are consistent with an uncomplicated URI. DDx: COVID-19, acute bronchitis, bacterial sinusitis vs. pharyngitis, migraine, flu. Symptomatic therapy suggested: acetaminophen, ibuprofen, antihistamine-decongestant of choice, cough suppressant of choice. Increase fluids, use vaporizer, stay in steamy bathroom tid 15 min prn severe cough, tylenol as needed, rest, avoid smoky areas. Lack of antibiotic effectiveness discussed with her. Symptomatic therapy suggested: gargle for sore throat, use mist at bedside for congestion. Apply facial warm packs for sinus pain or use nasal saline sprays. Follow up prn if not better in 72 hours. Amount and/or Complexity of Data Reviewed  Clinical lab tests: ordered and reviewed  Review and summarize past medical records: yes       ED Course:   4:52 PM  Pt reassessed and updated on all results and findings. He reports nausea is completely resolved and he has been able to tolerated PO intake without difficulty or additional episodes of emesis. Pt negative for both covid and influenza but informed that sx's are still likely secondary to viral URI. He has been educated on strict return precautions and agrees to follow up with his PCP in the next week if sx's persist or return to ED if condition worsens. No additional questions or concerns. Anticipate discharge home shortly.          Procedures Medical Decision Makingedical Decision Making  Performed by: Josefina Ziegler PA-C  PROCEDURES:  Procedures       Disposition   Disposition: DC- Adult Discharges: All of the diagnostic tests were reviewed and questions answered. Diagnosis, care plan and treatment options were discussed. The patient understands the instructions and will follow up as directed. The patients results have been reviewed with them. They have been counseled regarding their diagnosis. The patient verbally convey understanding and agreement of the signs, symptoms, diagnosis, treatment and prognosis and additionally agrees to follow up as recommended with their PCP in 24 - 48 hours. They also agree with the care-plan and convey that all of their questions have been answered. I have also put together some discharge instructions for them that include: 1) educational information regarding their diagnosis, 2) how to care for their diagnosis at home, as well a 3) list of reasons why they would want to return to the ED prior to their follow-up appointment, should their condition change. Discharged    DISCHARGE PLAN:  1. Current Discharge Medication List      CONTINUE these medications which have NOT CHANGED    Details   chlorhexidine (Peridex) 0.12 % solution 15 mL by Swish and Spit route every twelve (12) hours for 14 days. Qty: 420 mL, Refills: 0      clotrimazole-betamethasone (Lotrisone) topical cream Apply  to affected area two (2) times a day. Apply to affected area  Qty: 1 Tube, Refills: 0      ARIPiprazole (ABILIFY) 30 mg tablet       risperiDONE (RisperDAL) 2 mg tablet       mupirocin (BACTROBAN) 2 % ointment Apply  to affected area daily. Qty: 22 g, Refills: 0      trimethoprim-sulfamethoxazole (BACTRIM DS, SEPTRA DS) 160-800 mg per tablet Take 1 Tab by mouth two (2) times a day.   Qty: 14 Tab, Refills: 0         STOP taking these medications       penicillin v potassium (VEETID) 500 mg tablet Comments:   Reason for Stopping: ibuprofen (MOTRIN) 600 mg tablet Comments:   Reason for Stoppin.   Follow-up Information     Follow up With Specialties Details Why Contact Info    Deedee Gary MD Family Medicine  As needed, If symptoms worsen [address] [facility],[address] [address] [phone]          3. Return to ED if worse   4. Discharge Medication List as of 2021  2:30 PM            Diagnosis     Clinical Impression:   1. Viral URI        Attestations:    Federica Quinn PA-C    Please note that this dictation was completed with [employer]), the [email or web address] voice recognition software. Quite often unanticipated grammatical, syntax, homophones, and other interpretive errors are inadvertently transcribed by the computer software. Please disregard these errors. Please excuse any errors that have escaped final proofreading. Thank you.

## 2021-12-20 ENCOUNTER — PATIENT OUTREACH (OUTPATIENT)
Dept: OTHER | Age: 21
End: 2021-12-20

## 2021-12-20 NOTE — PROGRESS NOTES
Patient identified as eligible for [address] [place] [address] services. Third telephone outreach attempted. Left discreet voicemail with this CM confidential contact information. UTR letter was sent on 12/15. Patient had another ED visit on 12/18 for viral URI, COVID and flu results were negative. Red flags:  Call your doctor now or seek immediate medical care if:  You have a new or higher fever. Your fever lasts more than 48 hours. You have trouble breathing. You have a fever with a stiff neck or a severe headache. You are sensitive to light. You feel very sleepy or confused.

## 2021-12-21 ENCOUNTER — PATIENT OUTREACH (OUTPATIENT)
Dept: OTHER | Age: 21
End: 2021-12-21

## 2021-12-21 NOTE — PROGRESS NOTES
Covering for Janett Marino RN, [facility]. Patient identified as eligible for [address] [place] [address] services. Fourth telephone outreach attempted. Left discreet voicemail with this CM confidential contact information. UTR letter has been sent.

## 2022-01-06 ENCOUNTER — PATIENT OUTREACH (OUTPATIENT)
Dept: OTHER | Age: 22
End: 2022-01-06

## 2022-01-06 NOTE — LETTER
1/6/2022 2:52 PM    Mr. Martha Blake        Dear Mr. Freddie Self. My name is Daniel Eckert, Associate Care Manager for [employer] and I have been trying to reach you. The Associate Care Management (ACM) program is a free-of-charge confidential service provided to our associates and their family members covered by the [facility]. The program will provide an associate and his/her family with the [facility] expertise to assist in navigating the health care delivery system, provider services, and their overall care needsso as to assure and improve health care interactions and enhance the quality of life. This program is designed to provide you with the opportunity to have a [employer] partner with you for the following services:     1) when you come home from the hospital or emergency room   2) when help is needed to manage your disease   3) when you need assistance coordinating services or appointments  4) when you need additional education, resources or assistance reaching your Be Well Health Program goals/requirements such as Be Well With Diabetes    [facility] is dedicated to empowering the good health of its community and improving the quality of care and care experiences for associates and their families. We are committed to safeguarding patient confidentiality and privacy, assuring that every associate has the respect he or she deserves in managing their health. The information shared with your care manager will not be shared with anyone else aside from those you identify as part of your care team, and will only be used to assist you with any identified care needs. Please contact me if you would like this service provided to you.      Sincerely,    Daniel Eckert, RN    [address] [place] [address] [address]  [id number]  [facility] [phone] Fax [email or web address]

## 2022-03-18 PROBLEM — F20.81 SCHIZOPHRENIFORM DISORDER (HCC): Status: ACTIVE | Noted: 2017-03-05

## 2022-03-18 PROBLEM — F22 PARANOID BEHAVIOR (HCC): Status: ACTIVE | Noted: 2017-03-19

## 2022-03-18 PROBLEM — F12.10 MARIJUANA ABUSE: Status: ACTIVE | Noted: 2017-03-06

## 2022-05-10 ENCOUNTER — HOSPITAL ENCOUNTER (EMERGENCY)
Age: 22
Discharge: STILL ADMITTED OR ADMINISTRATIVE | End: 2022-05-10

## 2022-05-10 VITALS
OXYGEN SATURATION: 99 % | TEMPERATURE: 98.8 F | RESPIRATION RATE: 16 BRPM | WEIGHT: 165 LBS | SYSTOLIC BLOOD PRESSURE: 126 MMHG | HEART RATE: 72 BPM | HEIGHT: 69 IN | DIASTOLIC BLOOD PRESSURE: 72 MMHG | BODY MASS INDEX: 24.44 KG/M2

## 2022-05-10 NOTE — ED TRIAGE NOTES
pt wants to be checked for STD denies any discharge. States he just wants to get checked. Recently broke up with girlfriend.

## 2022-05-15 ENCOUNTER — HOSPITAL ENCOUNTER (EMERGENCY)
Age: 22
Discharge: HOME | End: 2022-05-15
Attending: EMERGENCY MEDICINE
Payer: MEDICAID

## 2022-05-15 ENCOUNTER — APPOINTMENT (OUTPATIENT)
Dept: GENERAL RADIOLOGY | Age: 22
End: 2022-05-15
Attending: NURSE PRACTITIONER
Payer: MEDICAID

## 2022-05-15 VITALS
SYSTOLIC BLOOD PRESSURE: 112 MMHG | TEMPERATURE: 98 F | HEART RATE: 71 BPM | WEIGHT: 170 LBS | HEIGHT: 69 IN | RESPIRATION RATE: 18 BRPM | OXYGEN SATURATION: 98 % | DIASTOLIC BLOOD PRESSURE: 62 MMHG | BODY MASS INDEX: 25.18 KG/M2

## 2022-05-15 DIAGNOSIS — K64.9 HEMORRHOIDS, UNSPECIFIED HEMORRHOID TYPE: Primary | ICD-10-CM

## 2022-05-15 DIAGNOSIS — K59.00 CONSTIPATION, UNSPECIFIED CONSTIPATION TYPE: ICD-10-CM

## 2022-05-15 PROCEDURE — 74018 RADEX ABDOMEN 1 VIEW: CPT

## 2022-05-15 PROCEDURE — 99283 EMERGENCY DEPT VISIT LOW MDM: CPT

## 2022-05-15 RX ORDER — HYDROCORTISONE 25 MG/G
CREAM TOPICAL
Qty: 30 G | Refills: 0 | Status: SHIPPED | OUTPATIENT
Start: 2022-05-15 | End: 2022-09-23 | Stop reason: ALTCHOICE

## 2022-05-15 RX ORDER — POLYETHYLENE GLYCOL 3350 17 G/17G
17 POWDER, FOR SOLUTION ORAL DAILY
Qty: 510 G | Refills: 0 | Status: SHIPPED | OUTPATIENT
Start: 2022-05-15 | End: 2022-06-14

## 2022-05-15 RX ORDER — HYDROCORTISONE ACETATE 25 MG/1
25 SUPPOSITORY RECTAL EVERY 12 HOURS
Qty: 14 SUPPOSITORY | Refills: 0 | Status: SHIPPED | OUTPATIENT
Start: 2022-05-15 | End: 2022-05-22

## 2022-05-15 NOTE — ED PROVIDER NOTES
EMERGENCY DEPARTMENT HISTORY AND PHYSICAL EXAM      Date: 5/15/2022  Patient Name: Yoon Pittman. History of Presenting Illness     Chief Complaint   Patient presents with    Hemorrhoids       History Provided By: Patient    HPI: Yoon Pittman., 20 y.o. male with a past medical history significant hemorrhoid pain, suffers from constipation presents to the ED with cc of hermorrhoid pain, constipation    There are no other complaints, changes, or physical findings at this time. PCP: Sheeba Milton MD    No current facility-administered medications on file prior to encounter. No current outpatient medications on file prior to encounter. Past History     Past Medical History:  Past Medical History:   Diagnosis Date    Mental disorder     Psychiatric disorder     Reactive airway disease     Schizoaffective disorder (HCC)        Past Surgical History:  Past Surgical History:   Procedure Laterality Date    HX APPENDECTOMY         Family History:  Family History   Problem Relation Age of Onset    Seizures Neg Hx     Parkinsonism Neg Hx        Social History:  Social History     Tobacco Use    Smoking status: Current Every Day Smoker     Packs/day: 0.50     Types: Cigarettes     Start date: 6/1/2011    Smokeless tobacco: Never Used   Substance Use Topics    Alcohol use: No     Alcohol/week: 0.0 standard drinks    Drug use: Not Currently     Types: Marijuana     Comment: regular use- weekly since age 14       Allergies:  No Known Allergies      Review of Systems     Review of Systems   Constitutional: Negative. Negative for appetite change. HENT: Negative. Respiratory: Negative. Cardiovascular: Negative. Gastrointestinal: Positive for constipation and rectal pain. Genitourinary: Negative. Musculoskeletal: Negative. Skin: Negative. Neurological: Negative. All other systems reviewed and are negative.       Physical Exam     Physical Exam  Vitals and nursing note reviewed. Constitutional:       Appearance: Normal appearance. He is normal weight. HENT:      Head: Normocephalic and atraumatic. Eyes:      Extraocular Movements: Extraocular movements intact. Pupils: Pupils are equal, round, and reactive to light. Cardiovascular:      Rate and Rhythm: Normal rate and regular rhythm. Pulses: Normal pulses. Heart sounds: Normal heart sounds. Pulmonary:      Effort: Pulmonary effort is normal.      Breath sounds: Normal breath sounds. Abdominal:      General: Abdomen is flat. Genitourinary:     Rectum: Tenderness, external hemorrhoid and internal hemorrhoid present. No anal fissure. Normal anal tone. Comments: -thrombosed hemorrhoid . Musculoskeletal:         General: Normal range of motion. Skin:     General: Skin is warm and dry. Capillary Refill: Capillary refill takes less than 2 seconds. Neurological:      General: No focal deficit present. Mental Status: He is alert and oriented to person, place, and time. Psychiatric:         Mood and Affect: Mood normal.         Behavior: Behavior normal.         Lab and Diagnostic Study Results     Labs -   No results found for this or any previous visit (from the past 12 hour(s)). Radiologic Studies -   @lastxrresult@  CT Results  (Last 48 hours)    None        CXR Results  (Last 48 hours)    None            Medical Decision Making   - I am the first provider for this patient. - I reviewed the vital signs, available nursing notes, past medical history, past surgical history, family history and social history. - Initial assessment performed. The patients presenting problems have been discussed, and they are in agreement with the care plan formulated and outlined with them. I have encouraged them to ask questions as they arise throughout their visit. Vital Signs-Reviewed the patient's vital signs. No data found.     Records Reviewed: Nursing Notes    The patient presents with rectal pain with a differential diagnosis of thrombosed hemorrhoid, internal vs external hemorrhoid, anal fissure      ED Course:          Provider Notes (Medical Decision Making):     MDM  Number of Diagnoses or Management Options  Constipation, unspecified constipation type: minor  Hemorrhoids, unspecified hemorrhoid type: minor  Risk of Complications, Morbidity, and/or Mortality  Presenting problems: minimal  Diagnostic procedures: minimal  Management options: minimal  General comments: miralax and docusate to help establish bowel regimen,   Increase water  Increase fresh fruits and veggies. Avoid straining. Avoid prolonged standing            Procedures   Medical Decision Makingedical Decision Making  Performed by: Alex Paris NP  PROCEDURES:none  Procedures       Disposition   Disposition: Condition improved  DC- Adult Discharges: All of the diagnostic tests were reviewed and questions answered. Diagnosis, care plan and treatment options were discussed. The patient understands the instructions and will follow up as directed. The patients results have been reviewed with them. They have been counseled regarding their diagnosis. The patient verbally convey understanding and agreement of the signs, symptoms, diagnosis, treatment and prognosis and additionally agrees to follow up as recommended with their PCP in 24 - 48 hours. They also agree with the care-plan and convey that all of their questions have been answered. I have also put together some discharge instructions for them that include: 1) educational information regarding their diagnosis, 2) how to care for their diagnosis at home, as well a 3) list of reasons why they would want to return to the ED prior to their follow-up appointment, should their condition change. DC-The patient was given verbal follow-up instructions        DISCHARGE PLAN:  1. There are no discharge medications for this patient.     2.   Follow-up Information     Follow up With Specialties Details Why Contact Info    Juan Jose Cervantes MD Gastroenterology, Internal Medicine Physician   [address]  [phone]          3. Return to ED if worse   4. Discharge Medication List as of 5/15/2022  4:30 PM      START taking these medications    Details   polyethylene glycol (Miralax) 17 gram/dose powder Take 17 g by mouth daily for 30 days. 1 tablespoon with 8 oz of water daily, Normal, Disp-510 g, R-0      hydrocortisone (Anusol-HC) 25 mg supp Insert 1 Suppository into rectum every twelve (12) hours for 7 days. , Normal, Disp-14 Suppository, R-0      hydrocortisone (Anusol-HC) 2.5 % rectal cream Apply externally as need for rectal irriatation, Normal, Disp-30 g, R-0               Diagnosis     Clinical Impression:   1. Hemorrhoids, unspecified hemorrhoid type    2. Constipation, unspecified constipation type        Attestations:    Nereida Juan NP    Please note that this dictation was completed with [employer], the computer voice recognition software. Quite often unanticipated grammatical, syntax, homophones, and other interpretive errors are inadvertently transcribed by the computer software. Please disregard these errors. Please excuse any errors that have escaped final proofreading. Thank you.

## 2022-05-15 NOTE — Clinical Note
[facility] EMERGENCY DEPT  [facility] [place] [id number]  [phone]    Work/School Note    Date: 5/15/2022    To Whom It May concern:    Del Boeck. was seen and treated today in the emergency room by the following provider(s):  Attending Provider: Natalia Hart MD  Nurse Practitioner: Jl Beach NP. Del Boeck. is excused from work/school on 05/15/22 and 05/16/22. He is medically clear to return to work/school on 5/17/2022.        Sincerely,          Aspen Castro NP

## 2022-09-21 ENCOUNTER — TELEPHONE (OUTPATIENT)
Dept: FAMILY MEDICINE CLINIC | Age: 22
End: 2022-09-21

## 2022-09-21 NOTE — TELEPHONE ENCOUNTER
Called patient and left message Tomi Haines no longer at practice therefore appointment cancelled. Please call office to reschedule.

## 2022-09-23 ENCOUNTER — OFFICE VISIT (OUTPATIENT)
Dept: FAMILY MEDICINE CLINIC | Age: 22
End: 2022-09-23
Payer: COMMERCIAL

## 2022-09-23 VITALS
HEIGHT: 69 IN | OXYGEN SATURATION: 98 % | HEART RATE: 104 BPM | WEIGHT: 162 LBS | BODY MASS INDEX: 23.99 KG/M2 | SYSTOLIC BLOOD PRESSURE: 125 MMHG | TEMPERATURE: 97.7 F | RESPIRATION RATE: 16 BRPM | DIASTOLIC BLOOD PRESSURE: 61 MMHG

## 2022-09-23 DIAGNOSIS — Z79.899 LONG TERM CURRENT USE OF ANTIPSYCHOTIC MEDICATION: ICD-10-CM

## 2022-09-23 DIAGNOSIS — Z11.3 SCREENING EXAMINATION FOR STD (SEXUALLY TRANSMITTED DISEASE): Primary | ICD-10-CM

## 2022-09-23 DIAGNOSIS — B00.1 COLD SORE: ICD-10-CM

## 2022-09-23 DIAGNOSIS — Z23 IMMUNIZATION DUE: ICD-10-CM

## 2022-09-23 DIAGNOSIS — Z13.220 SCREENING CHOLESTEROL LEVEL: ICD-10-CM

## 2022-09-23 DIAGNOSIS — Z76.89 ENCOUNTER TO ESTABLISH CARE: ICD-10-CM

## 2022-09-23 DIAGNOSIS — Z83.3 FAMILY HISTORY OF DIABETES MELLITUS: ICD-10-CM

## 2022-09-23 PROCEDURE — 99204 OFFICE O/P NEW MOD 45 MIN: CPT | Performed by: FAMILY MEDICINE

## 2022-09-23 PROCEDURE — 90649 4VHPV VACCINE 3 DOSE IM: CPT | Performed by: FAMILY MEDICINE

## 2022-09-23 RX ORDER — PALIPERIDONE PALMITATE 156 MG/ML
INJECTION INTRAMUSCULAR
COMMUNITY
Start: 2022-09-07

## 2022-09-23 NOTE — PROGRESS NOTES
Subjective  Chief Complaint   Patient presents with    New Patient    Establish Care     Former patient of Amaryllis Lundborg, NP. Patient would like to be tested for STD's every 6 months and would like a Hep B vaccine     HPI:  Geneva Berger is a 20 y.o. male with medical problems as listed below. Past medical history: Schizoaffective disorder  Medications: Invega  Allergies: NKDA  Specialists: Dr. Heaven Rose (Psychiatry)  Hospitalizations: No recent hospitalizations or ER visits. Surgical history: Appendectomy (~10yo)  Family history: Cardiac arrest (mat GM). DM2 (dad). Social history: Former smoker. Quit 2 weeks ago. Smoked for 7 years ( [employer]). Drinks mixed drinks once per month. No illicit drug use, though marijuana is listed in chart from previously. Last sexual intercourse 1 month ago. Interested in females. Requesting STD screening today. Immunizations: COVID vaccine (x2). Declines influenza. Is interested in HPV series. Patient would also like the hepatitis B booster as he had requested hepatitis B titers drawn at a recent Urgent Care visit for STD screening. He reports the hepatitis B titers returned showing reduced immunity. R ring finger nodule: Lump on inside of right ring finger for several years. Sometimes painful, but usually not particularly noticeable. It does not limit his function.      Patient Active Problem List   Diagnosis Code    Schizophreniform disorder ([facility] [address] F20.81    Marijuana abuse F12.10    Paranoid behavior ([facility] [address] F22     Family History   Problem Relation Age of Onset    Seizures Neg Hx     Parkinsonism Neg Hx      Social History     Tobacco Use    Smoking status: Former     Packs/day: 0.50     Types: Cigarettes     Start date: 2011     Quit date: 2022     Years since quittin.0    Smokeless tobacco: Never   Substance Use Topics    Alcohol use: No     Alcohol/week: 0.0 standard drinks    Drug use: Not Currently     Types: Marijuana     Comment: regular use- weekly since age 14     Current Outpatient Medications on File Prior to Visit   Medication Sig Dispense Refill    Invega Sustenna 156 mg/mL injection INJECT 1 ML INTRAMUSCULARLY ONCE EVERY MONTH FOR 28 DAYS, 7 DAYS AFTER INITIAL INJECTIONS      [DISCONTINUED] hydrocortisone (Anusol-HC) 2.5 % rectal cream Apply externally as need for rectal irriatation (Patient not taking: Reported on 9/23/2022) 30 g 0     No current facility-administered medications on file prior to visit. No Known Allergies  Review of Systems   Constitutional:  Negative for chills and fever. Respiratory:  Negative for cough, shortness of breath and wheezing. Cardiovascular:  Negative for chest pain, palpitations and leg swelling. Gastrointestinal:  Negative for abdominal pain, constipation, diarrhea, nausea and vomiting. Neurological:  Negative for weakness and headaches. Psychiatric/Behavioral:  Negative for depression. The patient is not nervous/anxious. Objective  Visit Vitals  /61 (BP 1 Location: Right upper arm, BP Patient Position: Sitting, BP Cuff Size: Large adult)   Pulse (!) 104   Temp 97.7 °F (36.5 °C) (Temporal)   Resp 16   Ht 5' 9\" (1.753 m)   Wt 162 lb (73.5 kg)   SpO2 98%   BMI 23.92 kg/m²     Physical Exam  Constitutional:       General: He is not in acute distress. Appearance: Normal appearance. He is not ill-appearing. HENT:      Head: Normocephalic and atraumatic. Right Ear: External ear normal.      Left Ear: External ear normal.      Nose: Nose normal.      Mouth/Throat:      Mouth: Mucous membranes are moist.   Eyes:      Conjunctiva/sclera: Conjunctivae normal.   Cardiovascular:      Rate and Rhythm: Normal rate and regular rhythm. Heart sounds: Normal heart sounds. No murmur heard. Pulmonary:      Effort: Pulmonary effort is normal. No respiratory distress. Breath sounds: Normal breath sounds. Musculoskeletal:         General: No swelling. Normal range of motion.       Cervical back: Normal range of motion and neck supple. Right lower leg: No edema. Left lower leg: No edema. Skin:     General: Skin is warm and dry. Neurological:      General: No focal deficit present. Mental Status: He is alert and oriented to person, place, and time. Psychiatric:         Mood and Affect: Mood normal.         Behavior: Behavior normal.         Thought Content: Thought content normal.         Judgment: Judgment normal.        Assessment & Plan    ICD-10-CM ICD-9-CM    1. Screening examination for STD (sexually transmitted disease)  Z11.3 V74.5 CT/NG/T.VAGINALIS AMPLIFICATION      HIV 1/2 AG/AB, 4TH GENERATION,W RFLX CONFIRM      RPR      CANCELED: CHLAMYDIA/GC PCR      CANCELED: HIV 1/2 AG/AB, 4TH GENERATION,W RFLX CONFIRM      CANCELED: RPR      2. Encounter to establish care  R09.97 Z38.6 METABOLIC PANEL, COMPREHENSIVE      LIPID PANEL      HEMOGLOBIN A1C WITH EAG      TSH RFX ON ABNORMAL TO FREE T4      CANCELED: METABOLIC PANEL, COMPREHENSIVE      CANCELED: LIPID PANEL      CANCELED: HEMOGLOBIN A1C WITH EAG      3. Family history of diabetes mellitus  Z83.3 V18.0 HEMOGLOBIN A1C WITH EAG      CANCELED: METABOLIC PANEL, COMPREHENSIVE      CANCELED: HEMOGLOBIN A1C WITH EAG      4. Screening cholesterol level  Z13.220 V77.91 LIPID PANEL      CANCELED: LIPID PANEL      5. Long term current use of antipsychotic medication  B78.073 W40.91 METABOLIC PANEL, COMPREHENSIVE      LIPID PANEL      HEMOGLOBIN A1C WITH EAG      TSH RFX ON ABNORMAL TO FREE T4      CANCELED: TSH RFX ON ABNORMAL TO FREE T4      6. Cold sore  B00.1 054.9 HSV 1/2 AB, IGM      CANCELED: HSV 1/2 AB, IGM      7. Immunization due  Z23 V05.9 HUMAN PAPILLOMA VIRUS (HPV) VACCINE, TYPES 6, 11, 16, 18 (QUADRIVALENT), 3 DOSE SCHED., IM        Diagnoses and all orders for this visit:    1.  Screening examination for STD (sexually transmitted disease)  -     CT/NG/T.VAGINALIS AMPLIFICATION  -     HIV 1/2 AG/AB, 4TH GENERATION,W RFLX CONFIRM  -     RPR  2. Encounter to establish care  -     METABOLIC PANEL, COMPREHENSIVE  -     LIPID PANEL  -     HEMOGLOBIN A1C WITH EAG  -     TSH RFX ON ABNORMAL TO FREE T4  Diet and exercise counseling. 3. Family history of diabetes mellitus  -     HEMOGLOBIN A1C WITH EAG  4. Screening cholesterol level  -     LIPID PANEL  5. Long term current use of antipsychotic medication  -     METABOLIC PANEL, COMPREHENSIVE  -     LIPID PANEL  -     HEMOGLOBIN A1C WITH EAG  -     TSH RFX ON ABNORMAL TO FREE T4  6. Cold sore  -     HSV 1/2 AB, IGM  7. Immunization due  -     HUMAN PAPILLOMA VIRUS (HPV) VACCINE, TYPES 6, 11, 16, 18 (QUADRIVALENT), 3 DOSE SCHED., IM    Patient has contacted Urgent Care to fax records over with hepatitis B titer report. Will review, and if appropriate, will schedule nurse's visit to receive hepatitis B booster. Patient is not high risk for developing hepatitis B, but he is very concerned that he does not have immunity. Will need to complete records request form at next visit and send to psychiatrist, Dr. Heaven Rose.     Aspects of this note have been generated using voice recognition software. Despite editing, there may be some syntax errors.   Follow-up and Dispositions    Return in about 6 months (around 3/23/2023) for Annual exam.       Bacilio Zaragoza DO

## 2022-09-23 NOTE — PROGRESS NOTES
Room 1     Identified pt with two pt identifiers(name and ). Reviewed record in preparation for visit and have obtained necessary documentation. All patient medications has been reviewed. Chief Complaint   Patient presents with    New Patient    Establish Care     Former patient of Audra Vega NP. Patient would like to be tested for STD's every 6 months and would like a Hep B vaccine       3 most recent PHQ Screens 2022   PHQ Not Done -   Little interest or pleasure in doing things Not at all   Feeling down, depressed, irritable, or hopeless Not at all   Total Score PHQ 2 0   Trouble falling or staying asleep, or sleeping too much -   Feeling tired or having little energy -   Poor appetite, weight loss, or overeating -   Feeling bad about yourself - or that you are a failure or have let yourself or your family down -   Trouble concentrating on things such as school, work, reading, or watching TV -   Moving or speaking so slowly that other people could have noticed; or the opposite being so fidgety that others notice -   Thoughts of being better off dead, or hurting yourself in some way -   PHQ 9 Score -     Abuse Screening Questionnaire 3/28/2019   Do you ever feel afraid of your partner? N   Are you in a relationship with someone who physically or mentally threatens you? N   Is it safe for you to go home? Y       Health Maintenance Due   Topic    COVID-19 Vaccine (1)    Pneumococcal 0-64 years (1 - PPSV23 or PCV20)    Flu Vaccine (1)     Health Maintenance Review: Patient reminded of \"due or due soon\" health maintenance. I have asked the patient to contact his/her primary care provider (PCP) for follow-up on his/her health maintenance.     Vitals:    22 1259   BP: 125/61   Pulse: (!) 104   Resp: 16   Temp: 97.7 °F (36.5 °C)   TempSrc: Temporal   SpO2: 98%   Weight: 162 lb (73.5 kg)   Height: 5' 9\" (1.753 m)   PainSc:   0 - No pain       Wt Readings from Last 3 Encounters:   22 162 lb (73.5 kg) 05/15/22 170 lb (77.1 kg)   05/10/22 165 lb (74.8 kg)     Temp Readings from Last 3 Encounters:   09/23/22 97.7 °F (36.5 °C) (Temporal)   05/15/22 98 °F (36.7 °C)   05/10/22 98.8 °F (37.1 °C)     BP Readings from Last 3 Encounters:   09/23/22 125/61   05/15/22 112/62   05/10/22 126/72     Pulse Readings from Last 3 Encounters:   09/23/22 (!) 104   05/15/22 71   05/10/22 72       1. \"Have you been to the ER, urgent care clinic since your last visit? Hospitalized since your last visit? \" Yes When: 5/10/22, 5/15/22 Where: [facility] Reason for visit: STD Testing on 5/10/22 and Hemorrhoids on 5/15/22      2. \"Have you seen or consulted any other health care providers outside of the [address] [place] [address] since your last visit? \" No     3. For patients aged 39-70: Has the patient had a colonoscopy / FIT/ Cologuard?  NA - based on age

## 2022-09-27 LAB
ALBUMIN SERPL-MCNC: 5 G/DL (ref 4.1–5.2)
ALBUMIN/GLOB SERPL: 2.4 {RATIO} (ref 1.2–2.2)
ALP SERPL-CCNC: 76 IU/L (ref 44–121)
ALT SERPL-CCNC: 15 IU/L (ref 0–44)
AST SERPL-CCNC: 23 IU/L (ref 0–40)
BILIRUB SERPL-MCNC: 0.4 MG/DL (ref 0–1.2)
BUN SERPL-MCNC: 11 MG/DL (ref 6–20)
BUN/CREAT SERPL: 12 (ref 9–20)
CALCIUM SERPL-MCNC: 9.7 MG/DL (ref 8.7–10.2)
CHLORIDE SERPL-SCNC: 98 MMOL/L (ref 96–106)
CHOLEST SERPL-MCNC: 155 MG/DL (ref 100–199)
CO2 SERPL-SCNC: 24 MMOL/L (ref 20–29)
CREAT SERPL-MCNC: 0.9 MG/DL (ref 0.76–1.27)
EGFR: 123 ML/MIN/1.73
EST. AVERAGE GLUCOSE BLD GHB EST-MCNC: 114 MG/DL
GLOBULIN SER CALC-MCNC: 2.1 G/DL (ref 1.5–4.5)
GLUCOSE SERPL-MCNC: 74 MG/DL (ref 65–99)
HBA1C MFR BLD: 5.6 % (ref 4.8–5.6)
HDLC SERPL-MCNC: 60 MG/DL
HIV 1+2 AB+HIV1 P24 AG SERPL QL IA: NON REACTIVE
HSV1+2 IGM SER IA-ACNC: <0.91 RATIO (ref 0–0.9)
LDLC SERPL CALC-MCNC: 80 MG/DL (ref 0–99)
POTASSIUM SERPL-SCNC: 3.8 MMOL/L (ref 3.5–5.2)
PROT SERPL-MCNC: 7.1 G/DL (ref 6–8.5)
RPR SER QL: NON REACTIVE
SODIUM SERPL-SCNC: 140 MMOL/L (ref 134–144)
TRIGL SERPL-MCNC: 79 MG/DL (ref 0–149)
TSH SERPL DL<=0.005 MIU/L-ACNC: 1.48 UIU/ML (ref 0.45–4.5)
VLDLC SERPL CALC-MCNC: 15 MG/DL (ref 5–40)

## 2022-09-28 LAB
C TRACH RRNA SPEC QL NAA+PROBE: NEGATIVE
N GONORRHOEA RRNA SPEC QL NAA+PROBE: NEGATIVE
T VAGINALIS RRNA SPEC QL NAA+PROBE: NEGATIVE

## 2022-11-15 ENCOUNTER — OFFICE VISIT (OUTPATIENT)
Dept: FAMILY MEDICINE CLINIC | Age: 22
End: 2022-11-15
Payer: COMMERCIAL

## 2022-11-15 ENCOUNTER — TELEPHONE (OUTPATIENT)
Dept: FAMILY MEDICINE CLINIC | Age: 22
End: 2022-11-15

## 2022-11-15 ENCOUNTER — NURSE TRIAGE (OUTPATIENT)
Dept: OTHER | Facility: CLINIC | Age: 22
End: 2022-11-15

## 2022-11-15 VITALS
RESPIRATION RATE: 18 BRPM | OXYGEN SATURATION: 99 % | HEIGHT: 69 IN | HEART RATE: 70 BPM | BODY MASS INDEX: 24.44 KG/M2 | TEMPERATURE: 97.5 F | DIASTOLIC BLOOD PRESSURE: 55 MMHG | SYSTOLIC BLOOD PRESSURE: 108 MMHG | WEIGHT: 165 LBS

## 2022-11-15 DIAGNOSIS — K62.5 RECTAL BLEEDING: ICD-10-CM

## 2022-11-15 DIAGNOSIS — S39.94XA INJURY TO PENIS, INITIAL ENCOUNTER: Primary | ICD-10-CM

## 2022-11-15 NOTE — PROGRESS NOTES
1. Have you been to the ER, urgent care clinic since your last visit? Hospitalized since your last visit? No    2. Have you seen or consulted any other health care providers outside of the [address] [place] [address] since your last visit? Include any pap smears or colon screening.  No    Chief Complaint   Patient presents with    Penis Injury    Hemorrhoids     Visit Vitals  BP (!) 108/55 (BP 1 Location: Left upper arm, BP Patient Position: Sitting, BP Cuff Size: Large adult)   Pulse 70   Temp 97.5 °F (36.4 °C) (Temporal)   Resp 18   Ht 5' 9\" (1.753 m)   Wt 165 lb (74.8 kg)   SpO2 99%   BMI 24.37 kg/m²

## 2022-11-15 NOTE — TELEPHONE ENCOUNTER
Patient called stating that he had a pimple like bump on his penis. He states that  he popped the bump and it's not painful. He is not experiencing any pain during urination.  I advised the patient to go to an urgent care center for evaluation

## 2022-11-15 NOTE — PROGRESS NOTES
Subjective  Chief Complaint   Patient presents with    Penis Injury    Hemorrhoids     HPI:  Marco Antonio Purpura. is a 20 y.o. male with medical problems as listed below who presents for penis injury. Penis injury: Patient reports that he pulled a scab off of his penis and some skin came with it last night. He put a bandaid on it. He said there was a little bit of bleeding which has since resolved. He is still having some discomfort but thinks it might be related to where he had taped the band-aid. No known injury though patient thinks it could have come from [address]. He does report that he tried to clip some of the skin with finger nail clippers. Hemorrhoids: Patient reports rectal bleeding with bowel movements. No straining with BM's. Has been ongoing for last week. Eats fiber. Denies constipation. Regular bowel movements. No lightheadedness or dizziness. Patient Active Problem List   Diagnosis Code    Schizophreniform disorder ([facility] [address] F20.81    Marijuana abuse F12.10    Paranoid behavior ([facility] [address] F22     Family History   Problem Relation Age of Onset    Diabetes Father     Heart Attack Maternal Grandmother     Seizures Neg Hx     Parkinsonism Neg Hx      Social History     Tobacco Use    Smoking status: Former     Packs/day: 0.50     Years: 7.00     Pack years: 3.50     Types: Cigarettes     Start date: 2011     Quit date: 2022     Years since quittin.1    Smokeless tobacco: Never   Substance Use Topics    Alcohol use: No     Alcohol/week: 0.0 standard drinks    Drug use: Not Currently     Types: Marijuana     Comment: regular use- weekly since age 14     Current Outpatient Medications on File Prior to Visit   Medication Sig Dispense Refill    Invega Sustenna 156 mg/mL injection INJECT 1 ML INTRAMUSCULARLY ONCE EVERY MONTH FOR 28 DAYS, 7 DAYS AFTER INITIAL INJECTIONS (Patient not taking: Reported on 11/15/2022)       No current facility-administered medications on file prior to visit.      No Known Allergies  Review of Systems   Constitutional:  Negative for chills and fever. Respiratory:  Negative for cough, shortness of breath and wheezing. Cardiovascular:  Negative for chest pain, palpitations and leg swelling. Gastrointestinal:  Positive for blood in stool. Negative for abdominal pain, constipation, diarrhea, nausea and vomiting. Neurological:  Negative for weakness and headaches. Psychiatric/Behavioral:  Negative for depression. The patient is not nervous/anxious. Objective  Visit Vitals  BP (!) 108/55 (BP 1 Location: Left upper arm, BP Patient Position: Sitting, BP Cuff Size: Large adult)   Pulse 70   Temp 97.5 °F (36.4 °C) (Temporal)   Resp 18   Ht 5' 9\" (1.753 m)   Wt 165 lb (74.8 kg)   SpO2 99%   BMI 24.37 kg/m²     Physical Exam  Constitutional:       General: He is not in acute distress. Appearance: Normal appearance. He is not ill-appearing. HENT:      Head: Normocephalic and atraumatic. Eyes:      Extraocular Movements: Extraocular movements intact. Conjunctiva/sclera: Conjunctivae normal.   Cardiovascular:      Rate and Rhythm: Normal rate and regular rhythm. Heart sounds: Normal heart sounds. No murmur heard. Pulmonary:      Effort: Pulmonary effort is normal. No respiratory distress. Breath sounds: Normal breath sounds. Genitourinary:     Penis: Normal.       Testes: Normal.   Musculoskeletal:         General: No swelling. Normal range of motion. Cervical back: Normal range of motion and neck supple. Right lower leg: No edema. Left lower leg: No edema. Skin:     General: Skin is warm and dry. Neurological:      General: No focal deficit present. Mental Status: He is alert and oriented to person, place, and time. Psychiatric:         Mood and Affect: Mood normal.         Behavior: Behavior normal.         Thought Content:  Thought content normal.         Judgment: Judgment normal.        Assessment & Plan    ICD-10-CM ICD-9-CM    1. Injury to penis, initial encounter  S39.94XA 959.14       2. Rectal bleeding  K62.5 569.3 CBC W/O DIFF        Diagnoses and all orders for this visit:    1. Injury to penis, initial encounter  Benign appearing, healing abrasion on ventral side of penis. Advised patient to avoid bandaids or topicals and allow the lesion heal on its own. 2. Rectal bleeding  -     CBC W/O DIFF      Aspects of this note have been generated using voice recognition software. Despite editing, there may be some syntax errors.     Kodi Tomas, DO

## 2022-11-15 NOTE — PATIENT INSTRUCTIONS
If no improvement in rectal bleeding over the next 2-3 weeks, please return to clinic for follow up.

## 2022-11-15 NOTE — TELEPHONE ENCOUNTER
Pt called again stating that urgent care won't take his insurance.  Scheduled for same day visit w/ Dr. Zayra Fernandez today at 2:30 PM. Will send message to provider as an [facility]

## 2022-11-16 LAB
ERYTHROCYTE [DISTWIDTH] IN BLOOD BY AUTOMATED COUNT: 13.4 % (ref 11.6–15.4)
HCT VFR BLD AUTO: 42.4 % (ref 37.5–51)
HGB BLD-MCNC: 14.3 G/DL (ref 13–17.7)
MCH RBC QN AUTO: 28.6 PG (ref 26.6–33)
MCHC RBC AUTO-ENTMCNC: 33.7 G/DL (ref 31.5–35.7)
MCV RBC AUTO: 85 FL (ref 79–97)
PLATELET # BLD AUTO: 228 X10E3/UL (ref 150–450)
RBC # BLD AUTO: 5 X10E6/UL (ref 4.14–5.8)
WBC # BLD AUTO: 8.5 X10E3/UL (ref 3.4–10.8)

## 2022-11-28 ENCOUNTER — OFFICE VISIT (OUTPATIENT)
Dept: FAMILY MEDICINE CLINIC | Age: 22
End: 2022-11-28

## 2022-11-28 VITALS
OXYGEN SATURATION: 97 % | DIASTOLIC BLOOD PRESSURE: 69 MMHG | HEIGHT: 69 IN | WEIGHT: 174 LBS | BODY MASS INDEX: 25.77 KG/M2 | TEMPERATURE: 97.5 F | HEART RATE: 81 BPM | SYSTOLIC BLOOD PRESSURE: 107 MMHG

## 2022-11-28 DIAGNOSIS — R68.89 FLU-LIKE SYMPTOMS: Primary | ICD-10-CM

## 2022-11-28 NOTE — PROGRESS NOTES
1. Have you been to the ER, urgent care clinic since your last visit? Hospitalized since your last visit? No    2. Have you seen or consulted any other health care providers outside of the [address] [place] [address] since your last visit? Include any pap smears or colon screening.  No    Chief Complaint   Patient presents with    Flu Like Symptoms     2 wks vomited and felt better, approx 3 days began flu sx with body aches, productive cough and runny nose      Visit Vitals  /69 (BP 1 Location: Left upper arm, BP Patient Position: Sitting, BP Cuff Size: Large adult)   Pulse 81   Temp 97.5 °F (36.4 °C) (Temporal)   Ht 5' 9\" (1.753 m)   Wt 174 lb (78.9 kg)   SpO2 97%   BMI 25.70 kg/m²

## 2022-11-28 NOTE — PROGRESS NOTES
Subjective  Chief Complaint   Patient presents with    Flu Like Symptoms     2 wks vomited and felt better, approx 3 days began flu sx with body aches, productive cough and runny nose      HPI:  Jackie High is a 20 y.o. male with medical problems as listed below who presents for flu like symptoms. Patient reports body aches, chills, productive cough and rhinorrhea which started 1 week ago. When asked about sick contacts, patient states that a lot of people at work have been sick recently. Patient Active Problem List   Diagnosis Code    Schizophreniform disorder ([facility] [address] F20.81    Marijuana abuse F12.10    Paranoid behavior ([facility] [address] F22     Family History   Problem Relation Age of Onset    Diabetes Father     Heart Attack Maternal Grandmother     Seizures Neg Hx     Parkinsonism Neg Hx      Social History     Tobacco Use    Smoking status: Former     Packs/day: 0.50     Years: 7.00     Pack years: 3.50     Types: Cigarettes     Start date: 2011     Quit date: 2022     Years since quittin.2    Smokeless tobacco: Never   Substance Use Topics    Alcohol use: No     Alcohol/week: 0.0 standard drinks    Drug use: Not Currently     Types: Marijuana     Comment: regular use- weekly since age 14     Current Outpatient Medications on File Prior to Visit   Medication Sig Dispense Refill    Invega Sustenna 156 mg/mL injection INJECT 1 ML INTRAMUSCULARLY ONCE EVERY MONTH FOR 28 DAYS, 7 DAYS AFTER INITIAL INJECTIONS (Patient not taking: Reported on 11/15/2022)       No current facility-administered medications on file prior to visit. No Known Allergies  Review of Systems   Constitutional:  Positive for chills and malaise/fatigue. HENT:  Positive for congestion. Respiratory:  Positive for cough.       Objective  Visit Vitals  /69 (BP 1 Location: Left upper arm, BP Patient Position: Sitting, BP Cuff Size: Large adult)   Pulse 81   Temp 97.5 °F (36.4 °C) (Temporal)   Ht 5' 9\" (1.753 m)   Wt 174 lb (78.9 kg)   SpO2 97%   BMI 25.70 kg/m²     Physical Exam  Constitutional:       General: He is not in acute distress. Appearance: Normal appearance. He is not ill-appearing. HENT:      Head: Normocephalic and atraumatic. Right Ear: External ear normal.      Left Ear: External ear normal.      Nose: Nose normal. No congestion. Mouth/Throat:      Mouth: Mucous membranes are moist.      Pharynx: Oropharynx is clear. No oropharyngeal exudate or posterior oropharyngeal erythema. Eyes:      Extraocular Movements: Extraocular movements intact. Conjunctiva/sclera: Conjunctivae normal.   Cardiovascular:      Rate and Rhythm: Normal rate and regular rhythm. Heart sounds: Normal heart sounds. No murmur heard. Pulmonary:      Effort: Pulmonary effort is normal. No respiratory distress. Breath sounds: Normal breath sounds. Musculoskeletal:         General: No swelling. Normal range of motion. Cervical back: Normal range of motion and neck supple. Right lower leg: No edema. Left lower leg: No edema. Skin:     General: Skin is warm and dry. Neurological:      General: No focal deficit present. Mental Status: He is alert and oriented to person, place, and time. Psychiatric:         Mood and Affect: Mood normal.         Behavior: Behavior normal.         Thought Content: Thought content normal.         Judgment: Judgment normal.        Assessment & Plan    ICD-10-CM ICD-9-CM    1. Flu-like symptoms  R68.89 780.99 AMB POC RAPID INFLUENZA TEST        Diagnoses and all orders for this visit:    1. Flu-like symptoms  -     AMB POC RAPID INFLUENZA TEST  Patient tested positive for influenza. Outside window for treatment. Advised patient to continue symptomatic care. Wear mask in public and practice good hand hygiene. Advised patient to reschedule nurse's visit for vaccines. Aspects of this note have been generated using voice recognition software.  Despite editing, there may be some syntax errors.     Shahbaz Lal, DO

## 2022-11-29 LAB
QUICKVUE INFLUENZA TEST: POSITIVE
VALID INTERNAL CONTROL?: YES

## 2023-02-04 ENCOUNTER — HOSPITAL ENCOUNTER (EMERGENCY)
Age: 23
Discharge: HOME | End: 2023-02-04
Attending: EMERGENCY MEDICINE
Payer: COMMERCIAL

## 2023-02-04 VITALS
BODY MASS INDEX: 22.9 KG/M2 | OXYGEN SATURATION: 99 % | DIASTOLIC BLOOD PRESSURE: 71 MMHG | SYSTOLIC BLOOD PRESSURE: 125 MMHG | HEIGHT: 70 IN | WEIGHT: 160 LBS | RESPIRATION RATE: 18 BRPM | TEMPERATURE: 98.9 F | HEART RATE: 83 BPM

## 2023-02-04 DIAGNOSIS — J06.9 ACUTE UPPER RESPIRATORY INFECTION: Primary | ICD-10-CM

## 2023-02-04 PROCEDURE — 99282 EMERGENCY DEPT VISIT SF MDM: CPT

## 2023-02-04 NOTE — ED TRIAGE NOTES
PT reports flu symptoms for a week now. Pt reports nasal congestion, abdominal pain, and headache. Pt denies nausea/vomiting/diarrhea.

## 2023-02-04 NOTE — DISCHARGE INSTRUCTIONS
Tonight you were seen in the emergency room for a viral illness. You may continue to feel sick for the coming week or two, but there are some things you can do to feel better. I recommend increasing your fluid intake. Very warm or very cold fluids feel best when you have been coughing and have a sore throat. I recommend hot tea with honey, popsicles and soups. Rest often and stay away from others to control the spread of the virus    We do not have many good cough medicines that can eliminate your cough. That being said there have been multiple studies that show honey can improve/ quiet your cough. You can having this plain on a spoon or mixed into a hot drink such as tea. Viral illnesses can changes as they go along, likely your symptoms shall improve over time. If for any reason your symptoms worsen please follow-up with your primary care provider. If you have a high fever that is not relieved by over-the-counter fever reducing medications or difficulty breathing please come back to the emergency room for reevaluation.     I recommend the following over-the-counter medications-   OTC ibuprofen 600mg every 6 hours  Can take OTC tylenol PRN up to 3000mg in 24 hours

## 2023-02-04 NOTE — ED PROVIDER NOTES
Patient is a 20 y.o. M past medical history of reactive airway and schizoaffective disorder who presents today with complaints of nasal congestion. Patient states this morning he developed nasal congestion headache and chills. He did not have a thermometer to check his fever, but is reported episode of chills and malaise. He is endorsing mild nausea after eating, but no vomiting and able to able to eat and drink. Denies changes to bowel or bladder. Denies any difficulty breathing, choking, wheezing, apnea, oral cyanosis. Has not taken anything prior to arrival and states that he really just needs a work note. There are no other complaints, changes or physical findings at this time. ALLERGIES: Patient has no known allergies.     Past Medical History:   Diagnosis Date    Mental disorder     Psychiatric disorder     Reactive airway disease     Schizoaffective disorder ([facility] [address]      Past Surgical History:   Procedure Laterality Date    HX APPENDECTOMY       Family History:   Problem Relation Age of Onset    Diabetes Father     Heart Attack Maternal Grandmother     Seizures Neg Hx     Parkinsonism Neg Hx      Social History     Socioeconomic History    Marital status: SINGLE     Spouse name: Not on file    Number of children: 0    Years of education: Not on file    Highest education level: Not on file   Occupational History    Occupation: student [employer]     Comment: 15- 11 th grade   Tobacco Use    Smoking status: Former     Packs/day: 0.50     Years: 7.00     Pack years: 3.50     Types: Cigarettes     Start date: 2011     Quit date: 2022     Years since quittin.3    Smokeless tobacco: Never   Vaping Use    Vaping Use: Not on file   Substance and Sexual Activity    Alcohol use: No     Alcohol/week: 0.0 standard drinks    Drug use: Not Currently     Types: Marijuana     Comment: regular use- weekly since age 14    Sexual activity: Yes     Partners: Female     Birth control/protection: None, Condom   Other Topics Concern    Not on file   Social History Narrative    Live with mom brother and brothers GF    Now lives with Dad step mom ans sister good. HS at [employer], single. Social Determinants of Health     Financial Resource Strain: Low Risk     Difficulty of Paying Living Expenses: Not hard at all   Food Insecurity: No Food Insecurity    Worried About Running Out of Food in the Last Year: Never true    Ran Out of Food in the Last Year: Never true   Transportation Needs: Not on file   Physical Activity: Not on file   Stress: Not on file   Social Connections: Not on file   Intimate Partner Violence: Not on file   Housing Stability: Not on file           Review of Systems   Constitutional:  Negative for fever. HENT:  Negative for congestion. Eyes:  Negative for visual disturbance. Respiratory:  Negative for shortness of breath. Cardiovascular:  Negative for chest pain. Gastrointestinal:  Negative for nausea. Endocrine: Negative for polyuria. Genitourinary:  Negative for dysuria. Musculoskeletal:  Negative for back pain. Skin:  Negative for color change. Neurological:  Positive for headaches. Negative for seizures. Hematological:  Does not bruise/bleed easily. Psychiatric/Behavioral:  Negative for hallucinations. Vitals:    02/04/23 1527 02/04/23 1529   BP: 125/71    Pulse: 83    Resp: 18    Temp: 98.9 °F (37.2 °C)    SpO2: 99% 99%   Weight: 72.6 kg (160 lb)    Height: 5' 10\" (1.778 m)             Physical Exam  Constitutional:       Appearance: Normal appearance. HENT:      Head: Normocephalic and atraumatic. Mouth/Throat:      Comments: Tonsils 1+ bilaterally, uvula midline, no signs of airway compromise, no exudate on tonsils  Eyes:      Pupils: Pupils are equal, round, and reactive to light. Cardiovascular:      Rate and Rhythm: Normal rate and regular rhythm. Heart sounds: Normal heart sounds.    Pulmonary:      Effort: Pulmonary effort is normal. Breath sounds: Normal breath sounds. Musculoskeletal:      Cervical back: Normal range of motion. Skin:     General: Skin is dry. Neurological:      General: No focal deficit present. Mental Status: He is alert and oriented to person, place, and time. Psychiatric:         Mood and Affect: Mood normal.         Behavior: Behavior normal.            LABORATORY RESULTS:  No results found for this or any previous visit (from the past 24 hour(s)). IMAGING RESULTS:  No results found. MEDICATIONS GIVEN:  Medications - No data to display         MDM  Number of Diagnoses or Management Options  Acute upper respiratory infection  Diagnosis management comments: Seen today for constellation of symptoms concerning for viral illness. No difficulty breathing, syncope. Eating and drinking appropriately, no change in bowel or bladder. Physical exam is unremarkable, in no acute distress, normal inspiratory and expiratory effort, lungs CTA bilaterally. Oxygen saturation is 99 on room air. Based on these physical exam findings pneumonia is unlikely considered differentials including Influenza, COVID-19. Patient deferred viral swabbing , stating he really just needed a work note. Discharged with symptomatic management, Tylenol Motrin for any fevers/myalgias/pain. Recommend follow-up with PCP and given return  precautions. Recommended drinking plenty of fluids, resting, and staying home until fever free for 24 hours without the use of antipyretics. Discussed results and work-up with patient and answered all questions, the patient expresses understanding and agrees with the care plan and disposition. The patient was given an opportunity to ask questions and all concerns raised were addressed prior to discharge. Recommended patient follow-up with provider as listed below. Counseled patient on standard home and self-care measures.   Specifically explained the emergent conditions that could arise and clearly instructed the patient to return to the emergency department for those and any other new, worsening, or concerning symptoms. Patient stable and ready for discharge. IMPRESSION:  1. Acute upper respiratory infection        DISPOSITION:  Discharge    PLAN:  Follow-up Information       Follow up With Specialties Details Why Contact Sol Amador, DO Family Medicine Schedule an appointment as soon as possible for a visit   [address] [facility] [address]  [phone]      [facility] EMERGENCY DEPT Emergency Medicine  As needed, If symptoms worsen [address]  [phone]          Current Discharge Medication List          Please note that this dictation was completed with "[employer] the computer voice recognition software. Quite often unanticipated grammatical, syntax, homophones, and other interpretive errors are inadvertently transcribed by the computer software. Please disregard these errors. Please excuse any errors that have escaped final proofreading.

## 2023-02-04 NOTE — Clinical Note
[address]  [facility]ERGENCY DEPT  [address] [id number] [phone]    Work/School Note    Date: 2/4/2023    To Whom It May concern:    Logan Torres was seen and treated today in the emergency room by the following provider(s):  Attending Provider: Narcisa Aguayo MD  Nurse Practitioner: Michael Lopez NP. Logan Torres. is excused from work/school on 02/04/23 and 02/05/23. He is medically clear to return to work/school on 2/6/2023.    Patient should remain out of school/work until fever free for 24 hours without the use of fever reducing medication such as Tylenol or ibuprofen      Sincerely,          Reyes Skaggs NP

## 2023-03-17 ENCOUNTER — HOSPITAL ENCOUNTER (EMERGENCY)
Age: 23
Discharge: LEFT BEFORE DISCHARGE | End: 2023-03-17
Attending: EMERGENCY MEDICINE
Payer: COMMERCIAL

## 2023-03-17 VITALS
OXYGEN SATURATION: 99 % | TEMPERATURE: 98 F | WEIGHT: 160 LBS | RESPIRATION RATE: 16 BRPM | HEIGHT: 70 IN | HEART RATE: 70 BPM | SYSTOLIC BLOOD PRESSURE: 126 MMHG | BODY MASS INDEX: 22.9 KG/M2 | DIASTOLIC BLOOD PRESSURE: 73 MMHG

## 2023-03-17 DIAGNOSIS — Z71.1 CONCERN ABOUT STD IN MALE WITHOUT DIAGNOSIS: Primary | ICD-10-CM

## 2023-03-17 PROCEDURE — 99281 EMR DPT VST MAYX REQ PHY/QHP: CPT

## 2023-03-17 NOTE — ED PROVIDER NOTES
Wants to get tested for STD's. No symptoms at this time. No vomiting, cough, fever. Past Medical History:   Diagnosis Date    Mental disorder     Psychiatric disorder     Reactive airway disease     Schizoaffective disorder ([facility] [address]        Past Surgical History:   Procedure Laterality Date    HX APPENDECTOMY           Family History:   Problem Relation Age of Onset    Diabetes Father     Heart Attack Maternal Grandmother     Seizures Neg Hx     Parkinsonism Neg Hx        Social History     Socioeconomic History    Marital status: SINGLE     Spouse name: Not on file    Number of children: 0    Years of education: Not on file    Highest education level: Not on file   Occupational History    Occupation: student [employer]     Comment: 15-16 11 th grade   Tobacco Use    Smoking status: Former     Packs/day: 0.50     Years: 7.00     Pack years: 3.50     Types: Cigarettes     Start date: 2011     Quit date: 2022     Years since quittin.4    Smokeless tobacco: Never   Vaping Use    Vaping Use: Not on file   Substance and Sexual Activity    Alcohol use: No     Alcohol/week: 0.0 standard drinks    Drug use: Not Currently     Types: Marijuana     Comment: regular use- weekly since age 14    Sexual activity: Yes     Partners: Female     Birth control/protection: None, Condom   Other Topics Concern    Not on file   Social History Narrative    Live with mom brother and brothers GF    Now lives with Dad step mom ans sister good. HS at [employer], single.      Social Determinants of Health     Financial Resource Strain: Low Risk     Difficulty of Paying Living Expenses: Not hard at all   Food Insecurity: No Food Insecurity    Worried About Running Out of Food in the Last Year: Never true    Ran Out of Food in the Last Year: Never true   Transportation Needs: Not on file   Physical Activity: Not on file   Stress: Not on file   Social Connections: Not on file   Intimate Partner Violence: Not on file Housing Stability: Not on file         ALLERGIES: Patient has no known allergies. Review of Systems   Constitutional:  Negative for diaphoresis and fever. HENT:  Negative for facial swelling. Eyes:  Negative for visual disturbance. Respiratory:  Negative for cough. Cardiovascular:  Negative for chest pain. Gastrointestinal:  Negative for abdominal pain. Genitourinary:  Negative for dysuria. Musculoskeletal:  Negative for joint swelling. Skin:  Negative for rash. Neurological:  Negative for headaches. Hematological:  Negative for adenopathy. Psychiatric/Behavioral:  Negative for suicidal ideas. There were no vitals filed for this visit. Physical Exam  Vitals and nursing note reviewed. Constitutional:       General: He is not in acute distress. Appearance: He is well-developed. He is not ill-appearing. HENT:      Head: Normocephalic and atraumatic. Eyes:      Pupils: Pupils are equal, round, and reactive to light. Cardiovascular:      Rate and Rhythm: Normal rate and regular rhythm. Heart sounds: Normal heart sounds. Pulmonary:      Effort: Pulmonary effort is normal. No respiratory distress. Breath sounds: Normal breath sounds. Abdominal:      General: Bowel sounds are normal. There is no distension. Palpations: Abdomen is soft. Tenderness: There is no abdominal tenderness. Musculoskeletal:         General: Normal range of motion. Cervical back: Normal range of motion and neck supple. Skin:     General: Skin is warm and dry. Neurological:      Mental Status: He is alert and oriented to person, place, and time. Medical Decision Making  A:  Pt here for requesting STD testing. No symptoms. Had unprotected sex recently. No other complaints or concerns. Urine sent for GC/chlamydia testing. Pt referred to [facility] for further STD testing.            Procedures

## 2023-03-18 ENCOUNTER — PATIENT MESSAGE (OUTPATIENT)
Dept: FAMILY MEDICINE CLINIC | Age: 23
End: 2023-03-18

## 2023-03-19 ENCOUNTER — PATIENT MESSAGE (OUTPATIENT)
Dept: FAMILY MEDICINE CLINIC | Age: 23
End: 2023-03-19

## 2023-03-21 NOTE — TELEPHONE ENCOUNTER
----- Message from Molina Lindsey sent at 3/20/2023  3:07 PM EDT -----  Subject: Message to Provider    QUESTIONS  Information for Provider? Pt states that he would lie to be tested for all   STI's and have covid vaccine and boosters. Please call pt to advise.   ---------------------------------------------------------------------------  --------------  Maylin Sloan INFO  [phone]; OK to leave message on voicemail  ---------------------------------------------------------------------------  --------------  SCRIPT ANSWERS  Relationship to Patient?  Self

## 2023-03-22 NOTE — TELEPHONE ENCOUNTER
I spoke with the patient and advised him that he had testing done in Sept. He stated the he knew and the he has had sex with a new partner and just would like to be certain. I advised him that at this time we have no providers in office. He stated he understood and he would check with his local health dept.

## 2023-03-30 ENCOUNTER — TELEPHONE (OUTPATIENT)
Dept: FAMILY MEDICINE CLINIC | Age: 23
End: 2023-03-30

## 2023-03-30 DIAGNOSIS — Z23 IMMUNIZATION DUE: Primary | ICD-10-CM

## 2023-03-30 NOTE — TELEPHONE ENCOUNTER
Patient called requesting HPV and Hep B vaccine. I advised patient that I will pend order to provider and call him when able to .

## 2023-03-31 NOTE — TELEPHONE ENCOUNTER
System does not allow me to sign the orders because there is some missing information. Please update the orders so I can sign them.

## 2023-04-04 NOTE — TELEPHONE ENCOUNTER
I called to advise the patient that orders have been signed and that we now have a doctor in office for him to receive the requested vaccines.  No answer and vm not set up

## 2023-04-20 ENCOUNTER — HOSPITAL ENCOUNTER (EMERGENCY)
Age: 23
Discharge: HOME | End: 2023-04-21
Payer: COMMERCIAL

## 2023-04-20 VITALS
BODY MASS INDEX: 22.9 KG/M2 | HEIGHT: 70 IN | RESPIRATION RATE: 16 BRPM | HEART RATE: 64 BPM | TEMPERATURE: 98.2 F | SYSTOLIC BLOOD PRESSURE: 115 MMHG | DIASTOLIC BLOOD PRESSURE: 64 MMHG | OXYGEN SATURATION: 96 % | WEIGHT: 160 LBS

## 2023-04-20 DIAGNOSIS — T17.1XXA NASAL FOREIGN BODY, INITIAL ENCOUNTER: Primary | ICD-10-CM

## 2023-04-20 PROCEDURE — 99282 EMERGENCY DEPT VISIT SF MDM: CPT

## 2023-04-21 NOTE — ED PROVIDER NOTES
[facility] EMERGENCY DEPT  EMERGENCY DEPARTMENT HISTORY AND PHYSICAL EXAM      Date: 2023  Patient Name: Marco Antonio Becerril MRN: [id number]  YOB: 2000  Date of evaluation: 2023  Provider: Kisha Hickman NP   Note Started: 12:11 AM 23    HISTORY OF PRESENT ILLNESS     Chief Complaint   Patient presents with    [facility]       History Provided By: Patient    HPI: Marco Antonio Becerril is a 24 y.o. male with a past medical history as noted below presents to the emergency room with CC of nasal foreign body. Patient states he was brushing his teeth when a gnat flew in his left nare just prior to arrival.  He states he does not feel anything in his nose and denies having any trouble breathing but wants to be sure the gnat is not still in his nose. He is anxious pacing around the room and blowing his nose repeatedly.     PAST MEDICAL HISTORY   Past Medical History:  Past Medical History:   Diagnosis Date    Mental disorder     Psychiatric disorder     Reactive airway disease     Schizoaffective disorder ([facility] [address]        Past Surgical History:  Past Surgical History:   Procedure Laterality Date    HX APPENDECTOMY         Family History:  Family History   Problem Relation Age of Onset    Diabetes Father     Heart Attack Maternal Grandmother     Seizures Neg Hx     Parkinsonism Neg Hx        Social History:  Social History     Tobacco Use    Smoking status: Former     Packs/day: 0.50     Years: 7.00     Pack years: 3.50     Types: Cigarettes     Start date: 2011     Quit date: 2022     Years since quittin.5    Smokeless tobacco: Never   Substance Use Topics    Alcohol use: No     Alcohol/week: 0.0 standard drinks    Drug use: Not Currently     Types: Marijuana     Comment: regular use- weekly since age 14       Allergies:  No Known Allergies    PCP: Shun Feliz, DO    Current Meds:   Previous Medications    INVEGA SUSTENNA 156 MG/ML INJECTION    INJECT 1 ML INTRAMUSCULARLY ONCE EVERY MONTH FOR 28 DAYS, 7 DAYS AFTER INITIAL INJECTIONS       PHYSICAL EXAM     ED Triage Vitals [04/20/23 2321]   ED Encounter Vitals Group      /64      Pulse (Heart Rate) 64      Resp Rate 16      Temp 98.2 °F (36.8 °C)      Temp src       O2 Sat (%) 96 %      Weight 160 lb      Height 5' 10\"      Physical Exam  ***    SCREENINGS              LAB, EKG AND DIAGNOSTIC RESULTS   Labs:  No results found for this or any previous visit (from the past 12 hour(s)). EKG: Initial EKG interpreted by me. {EKG Smartlist:04428}    Radiologic Studies:  Non-plain film images such as CT, Ultrasound and MRI are read by the radiologist. Plain radiographic images are visualized and preliminarily interpreted by the ED Physician with the following findings: {Wet Read Interpretation:77040}    Interpretation per the Radiologist below, if available at the time of this note:  No results found. PROCEDURES   Unless otherwise noted below, none. Procedures      CRITICAL CARE TIME   {Critical Care Smartlist:98936}    ED COURSE and DIFFERENTIAL DIAGNOSIS/MDM   CC/HPI/PE Summary, DDx: ***    Records Reviewed (source and summary of external notes): Prior medical records and Nursing notes    Vitals:    Vitals:    04/20/23 2321   BP: 115/64   Pulse: 64   Resp: 16   Temp: 98.2 °F (36.8 °C)   SpO2: 96%   Weight: 72.6 kg (160 lb)   Height: 5' 10\" (1.778 m)        ED COURSE       Disposition Considerations (Tests not done, Shared Decision Making, Pt Expectation of Test or Treatment.): {Dispo consideration:59053}    Patient was given the following medications:  Medications - No data to display    CONSULTS: (Who and What was discussed)  None     Social Determinants affecting Dx or Tx: {Social Barriers:84765}    Smoking Cessation: {smoking cessation smartlist:33463}    FINAL IMPRESSION     1.  Nasal foreign body, initial encounter          DISPOSITION/PLAN   Discharged    {Disposition:94905}     PATIENT REFERRED TO:  Follow-up Information Follow up With Specialties Details Why [address], Margie Ng, DO Family Medicine Schedule an appointment as soon as possible for a visit  As needed [address] [facility]  [phone]                DISCHARGE MEDICATIONS:  Current Discharge Medication List            DISCONTINUED MEDICATIONS:  Current Discharge Medication List          I am the Primary Clinician of Record: Riaz Johnson NP (electronically signed)    (Please note that parts of this dictation were completed with voice recognition software. Quite often unanticipated grammatical, syntax, homophones, and other interpretive errors are inadvertently transcribed by the computer software. Please disregards these errors.  Please excuse any errors that have escaped final proofreading.)

## 2023-05-19 ENCOUNTER — TELEPHONE (OUTPATIENT)
Facility: CLINIC | Age: 23
End: 2023-05-19

## 2023-05-25 ENCOUNTER — TELEPHONE (OUTPATIENT)
Facility: CLINIC | Age: 23
End: 2023-05-25

## 2023-05-25 NOTE — TELEPHONE ENCOUNTER
----- Message from Jeri Choe sent at 5/25/2023  8:20 AM EDT -----  Subject: Appointment Request    Reason for Call: Established Patient Appointment needed: Urgent (Patient   Request) No Script    QUESTIONS    Reason for appointment request? Available appointments did not meet   patient need     Additional Information for Provider? Pt is asking to be seen urgently, if   not with Dr. Ambar Rodriguez, possibly with Dr. Ligia Myers for STD testing, no symptoms,   and hemorrhoids.  Please call to schedule.   ---------------------------------------------------------------------------  --------------  Paz Nicole INFO  [phone]; OK to leave message on voicemail  ---------------------------------------------------------------------------  --------------  SCRIPT ANSWERS  COVID Screen: Pallavi Laird

## 2023-05-26 ENCOUNTER — OFFICE VISIT (OUTPATIENT)
Facility: CLINIC | Age: 23
End: 2023-05-26

## 2023-05-26 VITALS
BODY MASS INDEX: 21.76 KG/M2 | TEMPERATURE: 98 F | OXYGEN SATURATION: 98 % | WEIGHT: 152 LBS | HEART RATE: 69 BPM | SYSTOLIC BLOOD PRESSURE: 108 MMHG | DIASTOLIC BLOOD PRESSURE: 60 MMHG | RESPIRATION RATE: 18 BRPM | HEIGHT: 70 IN

## 2023-05-26 DIAGNOSIS — Z11.3 SCREEN FOR STD (SEXUALLY TRANSMITTED DISEASE): Primary | ICD-10-CM

## 2023-05-26 DIAGNOSIS — K62.3 PARTIAL RECTAL PROLAPSE: Chronic | ICD-10-CM

## 2023-05-26 DIAGNOSIS — K64.4 RESIDUAL HEMORRHOIDAL SKIN TAGS: Chronic | ICD-10-CM

## 2023-05-26 PROBLEM — K64.9 HEMORRHOID: Chronic | Status: ACTIVE | Noted: 2023-05-26

## 2023-05-26 ASSESSMENT — PATIENT HEALTH QUESTIONNAIRE - PHQ9
SUM OF ALL RESPONSES TO PHQ9 QUESTIONS 1 & 2: 0
SUM OF ALL RESPONSES TO PHQ QUESTIONS 1-9: 0
2. FEELING DOWN, DEPRESSED OR HOPELESS: 0
SUM OF ALL RESPONSES TO PHQ QUESTIONS 1-9: 0
1. LITTLE INTEREST OR PLEASURE IN DOING THINGS: 0

## 2023-05-26 ASSESSMENT — ENCOUNTER SYMPTOMS
BLOOD IN STOOL: 1
CONSTIPATION: 0
VOMITING: 0
NAUSEA: 0
DIARRHEA: 0
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
## 2023-05-26 NOTE — PATIENT INSTRUCTIONS
Diagnosis(es) discussed to patient satisfaction. Walk 30 minutes after each meal.   Eat healthy with 5 serving (tennis ball size) of green veggies/fresh fruit. Beans and nuts are good proteins. Drink enough water to void clear after first morning urine. JOHN diet. No salty foods. No sodas. No junk foods. No sugar. Described ideal BM.

## 2023-05-27 LAB
HBV CORE AB SERPL QL IA: NEGATIVE
HCV IGG SERPL QL IA: NON REACTIVE
HIV 1+2 AB+HIV1 P24 AG SERPL QL IA: NON REACTIVE
RPR SER QL: NON REACTIVE

## 2023-05-30 LAB
C TRACH RRNA SPEC QL NAA+PROBE: NEGATIVE
N GONORRHOEA RRNA SPEC QL NAA+PROBE: NEGATIVE

## 2023-07-23 ENCOUNTER — HOSPITAL ENCOUNTER (EMERGENCY)
Facility: HOSPITAL | Age: 23
Discharge: HOME | End: 2023-07-23
Attending: EMERGENCY MEDICINE
Payer: COMMERCIAL

## 2023-07-23 VITALS
HEIGHT: 70 IN | WEIGHT: 150 LBS | RESPIRATION RATE: 18 BRPM | DIASTOLIC BLOOD PRESSURE: 68 MMHG | SYSTOLIC BLOOD PRESSURE: 119 MMHG | BODY MASS INDEX: 21.47 KG/M2 | TEMPERATURE: 98.9 F | OXYGEN SATURATION: 99 % | HEART RATE: 83 BPM

## 2023-07-23 DIAGNOSIS — R19.7 DIARRHEA, UNSPECIFIED TYPE: ICD-10-CM

## 2023-07-23 DIAGNOSIS — Z13.9 ENCOUNTER FOR MEDICAL SCREENING EXAMINATION: Primary | ICD-10-CM

## 2023-07-23 PROCEDURE — 99282 EMERGENCY DEPT VISIT SF MDM: CPT

## 2023-07-23 ASSESSMENT — LIFESTYLE VARIABLES
HOW MANY STANDARD DRINKS CONTAINING ALCOHOL DO YOU HAVE ON A TYPICAL DAY: PATIENT DOES NOT DRINK
HOW OFTEN DO YOU HAVE A DRINK CONTAINING ALCOHOL: NEVER

## 2023-07-23 ASSESSMENT — PAIN - FUNCTIONAL ASSESSMENT: PAIN_FUNCTIONAL_ASSESSMENT: NONE - DENIES PAIN

## 2023-07-23 NOTE — ED TRIAGE NOTES
Patient reports that he is here for a work note to return to work. Reports that he was sent home from work 2 days ago for diarrhea & abdominal pain. Reports that he feels much better.

## 2023-07-23 NOTE — ED PROVIDER NOTES
Group Topic: BH Check-in/Symptom Rating    Date: 12/11/2022  Start Time: 0920  End Time: 1000  Facilitators: Aissatou Alcantara CNA    Focus: My Personal Growth  Number in attendance: 4    Method: Group  Attendance: Present  Participation: Active  Patient Response: Appropriate feedback  Mood: Normal  Affect: Type: Euthymic (normal mood)   Range: Full (normal)   Congruency: Congruent   Stability: Stable  Behavior/Socialization: Appropriate to group and Engaged  Thought Process: Focused  Task Performance: Follows directions  Patient Evaluation: Independent - full participation       Smokeless tobacco: Never   Substance Use Topics    Alcohol use: No     Alcohol/week: 0.0 standard drinks    Drug use: Not Currently     Types: Marijuana Dorcus Sjogren)       Allergies:  No Known Allergies    Medications:  No current facility-administered medications for this encounter. Current Outpatient Medications   Medication Sig Dispense Refill    witch hazel-glycerin (TUCKS) pad Apply 1 each topically daily Place rectally as needed. 30 each 4       Social Determinants of Health:  Social Determinants of Health     Tobacco Use: Medium Risk    Smoking Tobacco Use: Former    Smokeless Tobacco Use: Never    Passive Exposure: Not on file   Alcohol Use: Not At Risk    Frequency of Alcohol Consumption: Never    Average Number of Drinks: Patient does not drink    Frequency of Binge Drinking: Never   Financial Resource Strain: Low Risk     Difficulty of Paying Living Expenses: Not hard at all   Food Insecurity: No Food Insecurity    Worried About Running Out of Food in the Last Year: Never true    [address] in the Last Year: Never true   Transportation Needs: No Transportation Needs    Lack of Transportation (Medical): No    Lack of Transportation (Non-Medical): No   Physical Activity: Sufficiently Active    Days of Exercise per Week: 5 days    Minutes of Exercise per Session: 60 min   Stress: No Stress Concern Present    Feeling of Stress : Not at all   Social Connections:  Moderately Isolated    Frequency of Communication with Friends and Family: More than three times a week    Frequency of Social Gatherings with Friends and Family: Not on file    Attends [employer] Services: Never    Active Member of Clubs or Organizations: Yes    Attends Club or Organization Meetings: Never    Marital Status: Never    Intimate Partner Violence: Not At Risk    Fear of Current or Ex-Partner: No    Emotionally Abused: No    Physically Abused: No    Sexually Abused: No   Depression: Not at risk    PHQ-2 Score: 0   Housing

## 2023-08-08 ENCOUNTER — OFFICE VISIT (OUTPATIENT)
Facility: CLINIC | Age: 23
End: 2023-08-08
Payer: COMMERCIAL

## 2023-08-08 VITALS
OXYGEN SATURATION: 97 % | HEIGHT: 70 IN | HEART RATE: 76 BPM | SYSTOLIC BLOOD PRESSURE: 117 MMHG | BODY MASS INDEX: 21.47 KG/M2 | TEMPERATURE: 97.5 F | RESPIRATION RATE: 18 BRPM | WEIGHT: 150 LBS | DIASTOLIC BLOOD PRESSURE: 71 MMHG

## 2023-08-08 DIAGNOSIS — R07.89 RIGHT-SIDED CHEST WALL PAIN: ICD-10-CM

## 2023-08-08 DIAGNOSIS — Z11.3 SCREENING EXAMINATION FOR STD (SEXUALLY TRANSMITTED DISEASE): ICD-10-CM

## 2023-08-08 DIAGNOSIS — Z11.3 SCREENING EXAMINATION FOR STD (SEXUALLY TRANSMITTED DISEASE): Primary | ICD-10-CM

## 2023-08-08 PROCEDURE — 99214 OFFICE O/P EST MOD 30 MIN: CPT | Performed by: FAMILY MEDICINE

## 2023-08-08 RX ORDER — HYDROXYZINE HYDROCHLORIDE 10 MG/1
TABLET, FILM COATED ORAL
COMMUNITY
Start: 2023-07-03 | End: 2023-08-08

## 2023-08-08 RX ORDER — DIVALPROEX SODIUM 500 MG/1
500 TABLET, DELAYED RELEASE ORAL 2 TIMES DAILY
COMMUNITY
Start: 2023-07-03

## 2023-08-08 RX ORDER — NICOTINE 14 MG/24H
PATCH, EXTENDED RELEASE TRANSDERMAL
COMMUNITY
Start: 2023-07-03 | End: 2023-08-08

## 2023-08-09 LAB
HIV 1+2 AB+HIV1 P24 AG SERPL QL IA: NON REACTIVE
RPR SER QL: NON REACTIVE

## 2023-08-15 ENCOUNTER — TELEPHONE (OUTPATIENT)
Facility: CLINIC | Age: 23
End: 2023-08-15

## 2023-08-15 NOTE — TELEPHONE ENCOUNTER
----- Message from [facility] KYLE Day sent at 8/14/2023  4:24 PM EDT -----  Subject: Results Request    QUESTIONS  Results: Chlamydia, Gonorrhea, Trichomoniasis,HIV 1/2 Ag/Ab, 4TH   Generation,W Rflx Confirm; Ordered by: Drena Duverney   Date Performed: 2023-08-08  ---------------------------------------------------------------------------  --------------  Sydney ARTHUR    [phone]; OK to leave message on voicemail  ---------------------------------------------------------------------------  --------------

## 2023-09-02 ENCOUNTER — HOSPITAL ENCOUNTER (EMERGENCY)
Facility: HOSPITAL | Age: 23
Discharge: HOME | End: 2023-09-02
Attending: STUDENT IN AN ORGANIZED HEALTH CARE EDUCATION/TRAINING PROGRAM
Payer: MEDICAID

## 2023-09-02 VITALS
OXYGEN SATURATION: 97 % | WEIGHT: 160 LBS | DIASTOLIC BLOOD PRESSURE: 52 MMHG | TEMPERATURE: 98 F | HEART RATE: 80 BPM | BODY MASS INDEX: 23.7 KG/M2 | SYSTOLIC BLOOD PRESSURE: 107 MMHG | RESPIRATION RATE: 16 BRPM | HEIGHT: 69 IN

## 2023-09-02 DIAGNOSIS — Z71.1 PERSON WITH FEARED COMPLAINT IN WHOM NO DIAGNOSIS WAS MADE: Primary | ICD-10-CM

## 2023-09-02 DIAGNOSIS — Z11.3 ROUTINE SCREENING FOR STI (SEXUALLY TRANSMITTED INFECTION): ICD-10-CM

## 2023-09-02 PROCEDURE — 87591 N.GONORRHOEAE DNA AMP PROB: CPT

## 2023-09-02 PROCEDURE — 99283 EMERGENCY DEPT VISIT LOW MDM: CPT

## 2023-09-02 PROCEDURE — 87491 CHLMYD TRACH DNA AMP PROBE: CPT

## 2023-09-02 RX ORDER — NITROGLYCERIN 0.4 MG/1
0.4 TABLET SUBLINGUAL ONCE
Status: DISCONTINUED | OUTPATIENT
Start: 2023-09-02 | End: 2023-09-02

## 2023-09-02 RX ORDER — LOPERAMIDE HYDROCHLORIDE 2 MG/1
2 CAPSULE ORAL 4 TIMES DAILY PRN
Qty: 12 CAPSULE | Refills: 0 | Status: SHIPPED | OUTPATIENT
Start: 2023-09-02 | End: 2023-09-06

## 2023-09-02 RX ORDER — KETOROLAC TROMETHAMINE 15 MG/ML
15 INJECTION, SOLUTION INTRAMUSCULAR; INTRAVENOUS
Status: DISCONTINUED | OUTPATIENT
Start: 2023-09-02 | End: 2023-09-02

## 2023-09-02 ASSESSMENT — LIFESTYLE VARIABLES
HOW OFTEN DO YOU HAVE A DRINK CONTAINING ALCOHOL: MONTHLY OR LESS
HOW MANY STANDARD DRINKS CONTAINING ALCOHOL DO YOU HAVE ON A TYPICAL DAY: 1 OR 2

## 2023-09-02 ASSESSMENT — PAIN - FUNCTIONAL ASSESSMENT: PAIN_FUNCTIONAL_ASSESSMENT: NONE - DENIES PAIN

## 2023-09-02 NOTE — ED PROVIDER NOTES
[facility] EMERGENCY DEPARTMENT  EMERGENCY DEPARTMENT HISTORY AND PHYSICAL EXAM      Date: 2023  Patient Name: Deanna Carrillo MRN: [id number]  Birthdate 2000  Date of evaluation: 2023  Provider: Becca Martinez MD   Note Started: 6:06 PM EDT 23    HISTORY OF PRESENT ILLNESS     Chief Complaint   Patient presents with    Diarrhea    Fatigue       History Provided By: Patient    HPI: Deanna Carrillo is a 24 y.o. male with past medical history as reviewed below presents for evaluation of intermittent diarrhea and fear of STI. Patient reports intermittent episodes of diarrhea for the last several months. There is nonbloody, no associated abdominal pain. Diarrhea often resolves on its own without intervention, but he has not taken any medication for this current episode. He also reports concern that he might have a sexually transmitted infection. He states he was recently tested, tested negative, but he says that there are many \"weird things \"that make him feel like he might have an STD. This includes an episode where he woke up in his car with his pants zipper down. He states the car door was locked, but because of this he is concerned that he might have an STD.     PAST MEDICAL HISTORY   Past Medical History:  Past Medical History:   Diagnosis Date    Herpes     Mental disorder     Psychiatric disorder     Reactive airway disease     Schizoaffective disorder ([address])        Past Surgical History:  Past Surgical History:   Procedure Laterality Date    APPENDECTOMY         Family History:  Family History   Problem Relation Age of Onset    Diabetes Father     Parkinsonism Neg Hx     Seizures Neg Hx     Heart Attack Maternal Grandmother        Social History:  Social History     Tobacco Use    Smoking status: Former     Packs/day: 0.50     Types: Cigarettes     Start date: 2011     Quit date: 2022     Years since quittin.9    Smokeless tobacco: Never   Substance Use Topics about these medications      divalproex 500 MG DR tablet  Commonly known as: DEPAKOTE     Invega Sustenna 156 MG/ML Comfort IM injection  Generic drug: paliperidone palmitate ER     witch hazel-glycerin pad  Commonly known as: TUCKS  Apply 1 each topically daily Place rectally as needed. Where to Get Your Medications        These medications were sent to [address] [place] [address], [address]      Phone: [phone]   loperamide 2 MG capsule           DISCONTINUED MEDICATIONS:  Current Discharge Medication List          I am the Primary Clinician of Record. Billy Frazier MD (electronically signed)    (Please note that parts of this dictation were completed with voice recognition software. Quite often unanticipated grammatical, syntax, homophones, and other interpretive errors are inadvertently transcribed by the computer software. Please disregards these errors.  Please excuse any errors that have escaped final proofreading.)       Billy Frazier MD  Resident  09/02/23 [id number]

## 2023-09-02 NOTE — ED TRIAGE NOTES
Pt with reports of fatigue and diarrhea that began last month. Pt states he is so fatigued that he can barely walk, pt is walking without difficulty and talking non stop in triage talking about how someone might have spit in his tea. Pt thinks this all might be due to an STD other than herpes that he endorses having.

## 2023-09-04 ENCOUNTER — HOSPITAL ENCOUNTER (EMERGENCY)
Facility: HOSPITAL | Age: 23
Discharge: HOME | End: 2023-09-04
Payer: MEDICAID

## 2023-09-04 VITALS
BODY MASS INDEX: 21.47 KG/M2 | SYSTOLIC BLOOD PRESSURE: 110 MMHG | DIASTOLIC BLOOD PRESSURE: 61 MMHG | WEIGHT: 150 LBS | RESPIRATION RATE: 18 BRPM | HEIGHT: 70 IN | TEMPERATURE: 98.2 F | OXYGEN SATURATION: 98 % | HEART RATE: 83 BPM

## 2023-09-04 DIAGNOSIS — J10.1 INFLUENZA B: Primary | ICD-10-CM

## 2023-09-04 LAB
FLUAV AG NPH QL IA: NEGATIVE
FLUBV AG NOSE QL IA: POSITIVE

## 2023-09-04 PROCEDURE — 87804 INFLUENZA ASSAY W/OPTIC: CPT

## 2023-09-04 PROCEDURE — 99283 EMERGENCY DEPT VISIT LOW MDM: CPT

## 2023-09-04 RX ORDER — IBUPROFEN 600 MG/1
600 TABLET ORAL 3 TIMES DAILY PRN
Qty: 30 TABLET | Refills: 0 | Status: SHIPPED | OUTPATIENT
Start: 2023-09-04

## 2023-09-04 ASSESSMENT — PAIN SCALES - GENERAL: PAINLEVEL_OUTOF10: 0

## 2023-09-04 ASSESSMENT — PAIN - FUNCTIONAL ASSESSMENT: PAIN_FUNCTIONAL_ASSESSMENT: 0-10

## 2023-09-04 NOTE — ED TRIAGE NOTES
Pt endorses URI symptoms x 3 days. Seen for the same with diarrhea which has resolved since 1 day ago per patient.

## 2023-09-04 NOTE — ED PROVIDER NOTES
[facility] EMERGENCY DEPARTMENT  EMERGENCY DEPARTMENT HISTORY AND PHYSICAL EXAM      Date: 2023  Patient Name: Vesna Scott MRN: [id number]  Birthdate 2000  Date of evaluation: 2023  Provider: JYOTHI Denis NP   Note Started: 11:06 AM EDT 23    HISTORY OF PRESENT ILLNESS     Chief Complaint   Patient presents with    URI       History Provided By: Patient    HPI: Vesna Scott is a 24 y.o. male with a history as below presents with fatigue. Patient states onset 3 days ago of fatigue, body aches, diarrhea, congestion, and rhinorrhea. States \"I think I have the flu. \"  All of his symptoms have resolved but he remains mildly fatigued. He denies fever, respiratory distress, chest pain, palpitations, abdominal pain, urinary symptoms, hematochezia, headache. Tolerating oral intake without difficulty. No flu vaccination. PAST MEDICAL HISTORY   Past Medical History:  Past Medical History:   Diagnosis Date    Herpes     Mental disorder     Psychiatric disorder     Reactive airway disease     Schizoaffective disorder ([address] [facility])        Past Surgical History:  Past Surgical History:   Procedure Laterality Date    APPENDECTOMY         Family History:  Family History   Problem Relation Age of Onset    Diabetes Father     Parkinsonism Neg Hx     Seizures Neg Hx     Heart Attack Maternal Grandmother        Social History:  Social History     Tobacco Use    Smoking status: Former     Packs/day: 0.50     Types: Cigarettes     Start date: 2011     Quit date: 2022     Years since quittin.9    Smokeless tobacco: Never   Substance Use Topics    Alcohol use: No     Alcohol/week: 0.0 standard drinks    Drug use: Not Currently     Types: Marijuana Kreg Sjogren)       Allergies:  No Known Allergies    PCP: Ashley Funes DO    Current Meds:   No current facility-administered medications for this encounter.      Current Outpatient Medications   Medication Sig Dispense Refill    ibuprofen

## 2023-09-30 ENCOUNTER — HOSPITAL ENCOUNTER (EMERGENCY)
Facility: HOSPITAL | Age: 23
Discharge: HOME | End: 2023-10-01
Attending: EMERGENCY MEDICINE
Payer: MEDICAID

## 2023-09-30 ENCOUNTER — APPOINTMENT (OUTPATIENT)
Facility: HOSPITAL | Age: 23
End: 2023-09-30
Payer: MEDICAID

## 2023-09-30 VITALS
TEMPERATURE: 98.6 F | RESPIRATION RATE: 18 BRPM | BODY MASS INDEX: 23.62 KG/M2 | WEIGHT: 165 LBS | OXYGEN SATURATION: 99 % | SYSTOLIC BLOOD PRESSURE: 118 MMHG | HEART RATE: 75 BPM | HEIGHT: 70 IN | DIASTOLIC BLOOD PRESSURE: 65 MMHG

## 2023-09-30 DIAGNOSIS — K59.00 CONSTIPATION, UNSPECIFIED CONSTIPATION TYPE: Primary | ICD-10-CM

## 2023-09-30 DIAGNOSIS — R09.A0 FOREIGN BODY SENSATION, UNSPECIFIED: ICD-10-CM

## 2023-09-30 PROCEDURE — 99283 EMERGENCY DEPT VISIT LOW MDM: CPT

## 2023-09-30 PROCEDURE — 74022 RADEX COMPL AQT ABD SERIES: CPT

## 2023-09-30 ASSESSMENT — PAIN SCALES - GENERAL: PAINLEVEL_OUTOF10: 6

## 2023-09-30 ASSESSMENT — PAIN - FUNCTIONAL ASSESSMENT: PAIN_FUNCTIONAL_ASSESSMENT: 0-10

## 2023-10-01 RX ORDER — 0.9 % SODIUM CHLORIDE 0.9 %
1000 INTRAVENOUS SOLUTION INTRAVENOUS
Status: DISCONTINUED | OUTPATIENT
Start: 2023-10-01 | End: 2023-10-01

## 2023-10-01 RX ORDER — POLYETHYLENE GLYCOL 3350 17 G/17G
17 POWDER ORAL DAILY
Qty: 225 G | Refills: 0 | Status: SHIPPED | OUTPATIENT
Start: 2023-10-01 | End: 2023-10-31

## 2023-10-01 NOTE — ED TRIAGE NOTES
Requesting chest xray due to \"wanting to make sure nothing crawled into my mouth while I was asleep and now its in my chest.\" Also states \"my poop has been small. \" Last BM was today

## 2023-10-01 NOTE — ED PROVIDER NOTES
EMERGENCY DEPARTMENT HISTORY AND PHYSICAL EXAM    Date: 9/30/2023  Patient Name: Radha Steinberg. History of Presenting Illness     Chief Complaint   Patient presents with    OTHER       History Provided By: Patient    HPI: Radha Steinberg., 24 y.o. male   presents to the ED with cc of foreign body sensation. Patient states that he woke up 1 morning approximately week ago and felt like he swallowed a unknown foreign body. He is concerned of foreign body or into his chest.  Patient also complains of mild abdominal discomfort with constipation. No chest pain. No shortness of breath. No cough. No dysuria hematuria. No nausea vomiting. No loss of appetite. No fever or chills. No URI symptoms. PCP: Phoebe Stacy,     No current facility-administered medications on file prior to encounter. Current Outpatient Medications on File Prior to Encounter   Medication Sig Dispense Refill    ibuprofen (ADVIL;MOTRIN) 600 MG tablet Take 1 tablet by mouth 3 times daily as needed for Pain 30 tablet 0    divalproex (DEPAKOTE) 500 MG DR tablet Take 1 tablet by mouth 2 times daily (Patient not taking: Reported on 8/8/2023)      paliperidone palmitate ER (INVEGA SUSTENNA) 156 MG/ML ANA CRISTINA IM injection Inject 156 mg into the muscle once      witch hazel-glycerin (TUCKS) pad Apply 1 each topically daily Place rectally as needed.  (Patient not taking: Reported on 8/8/2023) 30 each 4       Past History     Past Medical History:  Past Medical History:   Diagnosis Date    Herpes     Mental disorder     Psychiatric disorder     Reactive airway disease     Schizoaffective disorder ([address])        Past Surgical History:  Past Surgical History:   Procedure Laterality Date    APPENDECTOMY         Family History:  Family History   Problem Relation Age of Onset    Diabetes Father     Parkinsonism Neg Hx     Seizures Neg Hx     Heart Attack Maternal Grandmother        Social History:  Social History     Tobacco Use